# Patient Record
Sex: MALE | Race: WHITE | NOT HISPANIC OR LATINO | Employment: FULL TIME | URBAN - METROPOLITAN AREA
[De-identification: names, ages, dates, MRNs, and addresses within clinical notes are randomized per-mention and may not be internally consistent; named-entity substitution may affect disease eponyms.]

---

## 2020-11-10 ENCOUNTER — OFFICE VISIT (OUTPATIENT)
Dept: ORTHOPEDIC SURGERY | Facility: CLINIC | Age: 55
End: 2020-11-10

## 2020-11-10 VITALS — WEIGHT: 217 LBS | HEIGHT: 73 IN | BODY MASS INDEX: 28.76 KG/M2 | TEMPERATURE: 97.1 F

## 2020-11-10 DIAGNOSIS — M54.50 LOW BACK PAIN, UNSPECIFIED BACK PAIN LATERALITY, UNSPECIFIED CHRONICITY, UNSPECIFIED WHETHER SCIATICA PRESENT: Primary | ICD-10-CM

## 2020-11-10 DIAGNOSIS — M51.26 HERNIATED LUMBAR INTERVERTEBRAL DISC: ICD-10-CM

## 2020-11-10 PROCEDURE — 72100 X-RAY EXAM L-S SPINE 2/3 VWS: CPT | Performed by: ORTHOPAEDIC SURGERY

## 2020-11-10 PROCEDURE — 99204 OFFICE O/P NEW MOD 45 MIN: CPT | Performed by: ORTHOPAEDIC SURGERY

## 2020-11-10 RX ORDER — GABAPENTIN 400 MG/1
400 CAPSULE ORAL 3 TIMES DAILY
COMMUNITY
Start: 2020-10-29 | End: 2022-03-08

## 2020-11-10 RX ORDER — OMEPRAZOLE 20 MG/1
CAPSULE, DELAYED RELEASE ORAL
COMMUNITY
Start: 2020-10-14

## 2020-11-10 RX ORDER — HYDROCODONE BITARTRATE AND ACETAMINOPHEN 5; 325 MG/1; MG/1
TABLET ORAL
COMMUNITY
Start: 2020-10-29 | End: 2022-03-08

## 2020-11-10 NOTE — PROGRESS NOTES
New patient or new problem visit    Chief Complaint   Patient presents with   • Lumbar Spine - Initial Evaluation, Pain       HPI: He complains of low back pain which radiates the left lower extremity occasionally the right more back than leg pain epidurals helped a lot in the past the pain is moderate constant stabbing worse with activity.  Gets better as the day goes on.  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  He is on gabapentin and hydrocodone.  No fever chills weight loss balance difficulties bowel or bladder complaints.    PFSH: See chart- reviewed    Review of Systems   HENT: Positive for sinus pressure and sinus pain.    Respiratory: Positive for apnea.    Musculoskeletal: Positive for arthralgias, back pain, joint swelling, myalgias, neck pain and neck stiffness.   Neurological: Positive for numbness and headaches.   Psychiatric/Behavioral: Positive for sleep disturbance.       PE: Constitutional: Vital signs above-noted.  Awake, alert and oriented    Psychiatric: Affect and insight do not appear grossly disturbed.    Pulmonary: Breathing is unlabored, color is good.    Skin: Warm, dry and normal turgor    Cardiac: Pedal pulses intact.  No edema.    Eyesight and hearing appear adequate for examination purposes      Musculoskeletal:  There is some tenderness to percussion and palpation of the lumbosacral spine. Motion appears undisturbed.  Posture is unremarkable to coronal and sagittal inspection.    The skin about the area is intact.  There is no palpable or visible deformity.  There is no local spasm.       Neurologic:   Reflexes are 2+ and symmetrical in the patellae and absent in the Achilles.   Motor function is undisturbed in quadriceps, EHL, and gastrocnemius   sensation appears symmetrically intact to light touch.  In the bilateral lower extremities there is no evidence of atrophy.   Clonus is absent..  Gait appears undisturbed.  SLR test  negative      MEDICAL DECISION MAKING    XRAY: Plain film x-rays of the lumbar spine are fairly unremarkable mild degenerative changes.  No comparison views are available.  MRI scan from [facility] demonstrates herniated disc on the right side at L4-5 which has migrated caudally and then L5-S1 disc space degeneration.  There is also a mass in the body of S1 which appears around high intensity on T2 signal in slightly increased intensity on T1 which does not appear to be doing much to the surrounding bone and the radiologist states may represent an atypical hemangioma and list some other differentials including possibility of metastatic disease.  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  Still I think further evaluation is needed    Plan: I plan CT scan of the lumbar spine but I am going to go ahead and order physical therapy as well.  I am going to see him back in 6 weeks.

## 2020-11-12 ENCOUNTER — TELEPHONE (OUTPATIENT)
Dept: ORTHOPEDIC SURGERY | Facility: CLINIC | Age: 55
End: 2020-11-12

## 2020-11-12 NOTE — TELEPHONE ENCOUNTER
Caller:  GARRISON MONROE-PATIENT    Relationship: SELF    Best call back number:[phone]     What orders are you requesting (i.e. lab or imaging):  PHYSICAL THERAPY ORDER IS IN [facility]        Where will you receive your lab/imaging services: PATIENT WANTS ORDER FAXED TO [facility] IN [place]. FAX # [phone]

## 2020-11-19 ENCOUNTER — HOSPITAL ENCOUNTER (OUTPATIENT)
Dept: CT IMAGING | Facility: HOSPITAL | Age: 55
Discharge: HOME | End: 2020-11-19
Admitting: ORTHOPAEDIC SURGERY

## 2020-11-19 DIAGNOSIS — M54.50 LOW BACK PAIN, UNSPECIFIED BACK PAIN LATERALITY, UNSPECIFIED CHRONICITY, UNSPECIFIED WHETHER SCIATICA PRESENT: ICD-10-CM

## 2020-11-19 PROCEDURE — 72131 CT LUMBAR SPINE W/O DYE: CPT

## 2020-12-15 ENCOUNTER — TELEPHONE (OUTPATIENT)
Dept: ORTHOPEDIC SURGERY | Facility: CLINIC | Age: 55
End: 2020-12-15

## 2020-12-15 DIAGNOSIS — M51.26 HERNIATED LUMBAR INTERVERTEBRAL DISC: ICD-10-CM

## 2020-12-15 DIAGNOSIS — M54.50 LOW BACK PAIN, UNSPECIFIED BACK PAIN LATERALITY, UNSPECIFIED CHRONICITY, UNSPECIFIED WHETHER SCIATICA PRESENT: Primary | ICD-10-CM

## 2020-12-16 NOTE — TELEPHONE ENCOUNTER
Mri show arthritis and a pinched nerve.  For now try PT and or DANIEL.  Surgery could be a last option later. plz answer his questions as I will be unable to call him back in the near future

## 2020-12-17 NOTE — TELEPHONE ENCOUNTER
Have contacted the patient regarding his MRI results.  He does have some arthritis as well as a pinched nerve.  Have discussed that surgery is an option but he would prefer to try conservative measures before considering surgery.  Patient is agreeable and would like to hold off on surgery if at all possible.  Dr. Donahue is recommending therapy as well as epidural injections.  Patient actually is in the process of setting up physical therapy they are in [place] where he lives.  He also would like to proceed with the lumbar epidural.  Order has been placed and will have the scheduling department contact the patient to set it up.  Have instructed the patient to contact us if either 1 of these do not help with his symptoms or if his symptoms should become worse

## 2021-01-05 ENCOUNTER — HOSPITAL ENCOUNTER (OUTPATIENT)
Dept: PAIN MEDICINE | Facility: HOSPITAL | Age: 56
Discharge: HOME | End: 2021-01-05

## 2021-01-05 ENCOUNTER — ANESTHESIA EVENT (OUTPATIENT)
Dept: PAIN MEDICINE | Facility: HOSPITAL | Age: 56
End: 2021-01-05

## 2021-01-05 ENCOUNTER — ANESTHESIA (OUTPATIENT)
Dept: PAIN MEDICINE | Facility: HOSPITAL | Age: 56
End: 2021-01-05

## 2021-01-05 ENCOUNTER — HOSPITAL ENCOUNTER (OUTPATIENT)
Dept: GENERAL RADIOLOGY | Facility: HOSPITAL | Age: 56
Discharge: HOME | End: 2021-01-05

## 2021-01-05 VITALS
HEART RATE: 75 BPM | OXYGEN SATURATION: 97 % | SYSTOLIC BLOOD PRESSURE: 128 MMHG | RESPIRATION RATE: 16 BRPM | DIASTOLIC BLOOD PRESSURE: 80 MMHG | TEMPERATURE: 97.3 F

## 2021-01-05 DIAGNOSIS — R52 PAIN: ICD-10-CM

## 2021-01-05 DIAGNOSIS — M51.26 HERNIATED LUMBAR INTERVERTEBRAL DISC: ICD-10-CM

## 2021-01-05 DIAGNOSIS — M54.50 LOW BACK PAIN, UNSPECIFIED BACK PAIN LATERALITY, UNSPECIFIED CHRONICITY, UNSPECIFIED WHETHER SCIATICA PRESENT: ICD-10-CM

## 2021-01-05 PROCEDURE — C1755 CATHETER, INTRASPINAL: HCPCS

## 2021-01-05 PROCEDURE — 25010000002 METHYLPREDNISOLONE PER 80 MG: Performed by: ANESTHESIOLOGY

## 2021-01-05 PROCEDURE — 0 IOPAMIDOL 41 % SOLUTION: Performed by: ANESTHESIOLOGY

## 2021-01-05 PROCEDURE — 77003 FLUOROGUIDE FOR SPINE INJECT: CPT

## 2021-01-05 RX ORDER — FENTANYL CITRATE 50 UG/ML
50 INJECTION, SOLUTION INTRAMUSCULAR; INTRAVENOUS AS NEEDED
Status: DISCONTINUED | OUTPATIENT
Start: 2021-01-05 | End: 2021-01-06 | Stop reason: HOSPADM

## 2021-01-05 RX ORDER — SODIUM CHLORIDE 0.9 % (FLUSH) 0.9 %
3 SYRINGE (ML) INJECTION EVERY 12 HOURS SCHEDULED
Status: DISCONTINUED | OUTPATIENT
Start: 2021-01-05 | End: 2021-01-06 | Stop reason: HOSPADM

## 2021-01-05 RX ORDER — METHYLPREDNISOLONE ACETATE 80 MG/ML
80 INJECTION, SUSPENSION INTRA-ARTICULAR; INTRALESIONAL; INTRAMUSCULAR; SOFT TISSUE ONCE
Status: COMPLETED | OUTPATIENT
Start: 2021-01-05 | End: 2021-01-05

## 2021-01-05 RX ORDER — LIDOCAINE HYDROCHLORIDE 10 MG/ML
1 INJECTION, SOLUTION INFILTRATION; PERINEURAL ONCE
Status: DISCONTINUED | OUTPATIENT
Start: 2021-01-05 | End: 2021-01-06 | Stop reason: HOSPADM

## 2021-01-05 RX ORDER — MIDAZOLAM HYDROCHLORIDE 1 MG/ML
1 INJECTION INTRAMUSCULAR; INTRAVENOUS AS NEEDED
Status: DISCONTINUED | OUTPATIENT
Start: 2021-01-05 | End: 2021-01-06 | Stop reason: HOSPADM

## 2021-01-05 RX ORDER — SODIUM CHLORIDE 0.9 % (FLUSH) 0.9 %
3-10 SYRINGE (ML) INJECTION AS NEEDED
Status: DISCONTINUED | OUTPATIENT
Start: 2021-01-05 | End: 2021-01-06 | Stop reason: HOSPADM

## 2021-01-05 RX ADMIN — IOPAMIDOL 10 ML: 408 INJECTION, SOLUTION INTRATHECAL at 13:54

## 2021-01-05 RX ADMIN — METHYLPREDNISOLONE ACETATE 80 MG: 80 INJECTION, SUSPENSION INTRA-ARTICULAR; INTRALESIONAL; INTRAMUSCULAR; SOFT TISSUE at 13:55

## 2021-01-05 NOTE — ANESTHESIA PROCEDURE NOTES
PAIN Epidural block    Pre-sedation assessment completed: 1/5/2021 1:46 PM    Patient reassessed immediately prior to procedure    Patient location during procedure: pain clinic  Start Time: 1/5/2021 1:48 PM  Stop Time: 1/5/2021 1:56 PM  Indication:procedure for pain  Performed By  Anesthesiologist: Regulo Luo MD  Preanesthetic Checklist  Completed: patient identified, site marked, surgical consent, pre-op evaluation, timeout performed, IV checked, risks and benefits discussed and monitors and equipment checked  Additional Notes  Post-Op Diagnosis Codes:     * Lumbar disc herniation (M51.26)    The patient was observed in recovery with no evidence of neurological deficits or other problems. All questions were answered. The patient was discharged with appropriate instructions.  Prep:  Pt Position:prone  Sterile Tech:cap, gloves, mask and sterile barrier  Prep:chlorhexidine gluconate and isopropyl alcohol  Monitoring:blood pressure monitoring, continuous pulse oximetry and EKG  Procedure:Sedation: no     Approach:midline  Guidance: fluoroscopy  Location:lumbar  Level:4-5  Needle Type:Tuohy  Needle Gauge:20 G  Aspiration:negative  Medications:  Depomedrol:80 mg  Preservative Free Saline:3mL  Isovue:1mL  Comments:Appropriate epidural spread of contrast.   Post Assessment:  Post-procedure: Dressing per nursing.  Pt Tolerance:patient tolerated the procedure well with no apparent complications  Complications:no

## 2021-01-05 NOTE — H&P
CHIEF COMPLAINT:  Low back pain        HISTORY OF PRESENT ILLNESS:  This patient is complaining of low back pain which radiates more or less in the L5 distribution the left leg crossing into his great toe.  He sometimes gets similar symptoms in the right leg. It ranges between a 5 and 8 out of 10 on the pain scale.  The pain is described as burning, hot, constant, sharp, and stabbing in nature. The pain is exacerbated by activity, exercise, lifting, lying down, running, sitting, standing, straining, twisting, and walking, and relieved by pain meds occasionally to some extent.  The patient has tried conservative therapy including: Ice, rest, heat, over-the-counter medication, prescription medication, steroids, and physical therapy.  The pain is significantly decreasing the patient's Activities of Daily Living.      Diagnostic imaging, specifically a CT scan of his lumbar spine from 11/19/2020 shows a disc extrusion at L4-L5 and a broad disc osteophyte complex at L5-S1.    This patient was referred to our clinic by Dr. Mandeep Donahue for lumbar epidural steroid injections.      PAST MEDICAL HISTORY:  Current Outpatient Medications on File Prior to Encounter   Medication Sig Dispense Refill   • gabapentin (NEURONTIN) 400 MG capsule Take 400 mg by mouth 3 (Three) Times a Day.     • HYDROcodone-acetaminophen (NORCO) 5-325 MG per tablet TAKE ONE TABLET BY MOUTH EVERY 8 HOURS AS NEEDED FOR FOURTEEN DAYS     • omeprazole (priLOSEC) 20 MG capsule TAKE ONE CAPSULE BY MOUTH EVERY DAY 30 MINUTES BEFORE MORNING MEAL       No current facility-administered medications on file prior to encounter.        Past Medical History:   Diagnosis Date   • GERD (gastroesophageal reflux disease)    • Low back pain        SOCIAL HISTORY:  Counseled to avoid tobacco     REVIEW OF SYSTEMS:  No pertinent hematologic, infectious, or constitutional symptoms.  Other review of systems non-contributory     PHYSICAL EXAM:  /99 (BP Location: Left  arm, Patient Position: Lying)   Pulse 75   Temp 36.3 °C (97.3 °F) (Infrared)   Resp 16   SpO2 98%   Constitutional: Well-developed well-nourished no acute distress  General: Alert and oriented  Neuromuscular: Straight leg raise test positive on the left.  Decreased as well as the lumbar spine pain with lumbar extension and flexion       DIAGNOSIS:  Post-Op Diagnosis Codes:  Post-Op Diagnosis Codes:     * Lumbar disc herniation [M51.26]    PLAN:  -  Lumbar epidural steroid injections at the planned level of L4-L5 spaced approximately 2-4 weeks apart.  If pain control is acceptable after this injection, it was discussed with the patient that they may return for the subsequent injections if and when their pain returns.    - Risks were discussed with the patient, including but not limited to: failure of relief, worsening pain, tissue, nerve, blood vessel or spinal cord damage, post-dural-puncture headache, bleeding, epidural hematoma, infection, and epidural abscess. Benefits and alternatives were also discussed. No promises were made. Risks/benefits/alternatives of procedural medications were also discussed, including but not limited to hyperglycemia, fluid retention, decreased immune system, osteoporosis, and anaphylactoid reactions. The patient voiced understanding and agreed to proceed.   -  Physical therapy exercises at home should be considered, as tolerated.  -  It is recommended that the patient use the least amount of opioid medication possible.     -  Heat and ice to the affected area as tolerated for pain control.  It was discussed that heating pads can cause burns.  -  Daily low impact exercise such as walking or water exercise was recommended to maintain overall health and aid in weight control.   -  Patient was counseled to abstain from tobacco products.  -  Follow up as needed for subsequent injections.      Regulo uLo M.D.  Partner, [facility] [place], [facility], [facility]  Board  Certified in Pain Medicine by the American Board of Anesthesiology  Board Certified in Anesthesiology by the American Board of Anesthesiology     Much of this encounter note is an electronic transcription/translation of spoken language to printed text. The electronic translation of spoken language may permit erroneous, or at times, nonsensical words or phrases to be inadvertently transcribed. Although I have reviewed the note for such errors, some may still exist.

## 2021-01-26 ENCOUNTER — HOSPITAL ENCOUNTER (OUTPATIENT)
Dept: OTHER | Facility: HOSPITAL | Age: 56
Discharge: HOME | End: 2021-01-26
Attending: NURSE PRACTITIONER

## 2021-03-10 ENCOUNTER — ANESTHESIA EVENT (OUTPATIENT)
Dept: PAIN MEDICINE | Facility: HOSPITAL | Age: 56
End: 2021-03-10

## 2021-03-10 ENCOUNTER — HOSPITAL ENCOUNTER (OUTPATIENT)
Dept: GENERAL RADIOLOGY | Facility: HOSPITAL | Age: 56
Discharge: HOME | End: 2021-03-10

## 2021-03-10 ENCOUNTER — HOSPITAL ENCOUNTER (OUTPATIENT)
Dept: PAIN MEDICINE | Facility: HOSPITAL | Age: 56
Discharge: HOME | End: 2021-03-10

## 2021-03-10 ENCOUNTER — ANESTHESIA (OUTPATIENT)
Dept: PAIN MEDICINE | Facility: HOSPITAL | Age: 56
End: 2021-03-10

## 2021-03-10 VITALS
DIASTOLIC BLOOD PRESSURE: 78 MMHG | OXYGEN SATURATION: 93 % | RESPIRATION RATE: 14 BRPM | SYSTOLIC BLOOD PRESSURE: 134 MMHG | HEART RATE: 74 BPM | TEMPERATURE: 97.3 F

## 2021-03-10 DIAGNOSIS — R52 PAIN: ICD-10-CM

## 2021-03-10 DIAGNOSIS — M51.26 HERNIATED LUMBAR INTERVERTEBRAL DISC: ICD-10-CM

## 2021-03-10 PROCEDURE — 77003 FLUOROGUIDE FOR SPINE INJECT: CPT

## 2021-03-10 PROCEDURE — 0 IOPAMIDOL 41 % SOLUTION: Performed by: ANESTHESIOLOGY

## 2021-03-10 PROCEDURE — 25010000002 METHYLPREDNISOLONE PER 80 MG: Performed by: ANESTHESIOLOGY

## 2021-03-10 RX ORDER — LIDOCAINE HYDROCHLORIDE 10 MG/ML
1 INJECTION, SOLUTION INFILTRATION; PERINEURAL ONCE AS NEEDED
Status: DISCONTINUED | OUTPATIENT
Start: 2021-03-10 | End: 2021-03-11 | Stop reason: HOSPADM

## 2021-03-10 RX ORDER — FENTANYL CITRATE 50 UG/ML
50 INJECTION, SOLUTION INTRAMUSCULAR; INTRAVENOUS AS NEEDED
Status: DISCONTINUED | OUTPATIENT
Start: 2021-03-10 | End: 2021-03-11 | Stop reason: HOSPADM

## 2021-03-10 RX ORDER — MIDAZOLAM HYDROCHLORIDE 1 MG/ML
1 INJECTION INTRAMUSCULAR; INTRAVENOUS AS NEEDED
Status: DISCONTINUED | OUTPATIENT
Start: 2021-03-10 | End: 2021-03-11 | Stop reason: HOSPADM

## 2021-03-10 RX ORDER — METHYLPREDNISOLONE ACETATE 80 MG/ML
80 INJECTION, SUSPENSION INTRA-ARTICULAR; INTRALESIONAL; INTRAMUSCULAR; SOFT TISSUE ONCE
Status: COMPLETED | OUTPATIENT
Start: 2021-03-10 | End: 2021-03-10

## 2021-03-10 RX ORDER — SODIUM CHLORIDE 0.9 % (FLUSH) 0.9 %
1-10 SYRINGE (ML) INJECTION AS NEEDED
Status: DISCONTINUED | OUTPATIENT
Start: 2021-03-10 | End: 2021-03-11 | Stop reason: HOSPADM

## 2021-03-10 RX ADMIN — METHYLPREDNISOLONE ACETATE 80 MG: 80 INJECTION, SUSPENSION INTRA-ARTICULAR; INTRALESIONAL; INTRAMUSCULAR; SOFT TISSUE at 12:48

## 2021-03-10 RX ADMIN — IOPAMIDOL 10 ML: 408 INJECTION, SOLUTION INTRATHECAL at 12:48

## 2021-03-10 NOTE — H&P
[facility]    History and Physical    Patient Name: Savage Carbajal  :  1965  MRN:  [id number]  Date of Admission: 3/10/2021    Subjective     Patient is a 56 y.o. male presents with chief complaint of chronic low back, hips: left and buttock pain.  Onset of symptoms was gradual starting several years ago.  Symptoms are associated/aggravated by nothing in particular. Symptoms improve with injection    The following portions of the patients history were reviewed and updated as appropriate: current medications, allergies, past medical history, past surgical history, past family history, past social history and problem list      He reports low back pain with radicular symptoms in about an L5 distribution.  He recently had an epidural steroid injection which improved his pain in both his back and his leg by about 70%.  He would like to have repeat injection.          Objective     Past Medical History:   Past Medical History:   Diagnosis Date   • GERD (gastroesophageal reflux disease)    • Low back pain      Past Surgical History:   Past Surgical History:   Procedure Laterality Date   • EPIDURAL BLOCK       Family History: No family history on file.  Social History:   Social History     Socioeconomic History   • Marital status:      Spouse name: Not on file   • Number of children: Not on file   • Years of education: Not on file   • Highest education level: Not on file   Tobacco Use   • Smoking status: Former Smoker     Quit date:      Years since quittin.1   • Smokeless tobacco: Never Used   Substance and Sexual Activity   • Alcohol use: Yes     Alcohol/week: 3.0 standard drinks     Types: 3 Cans of beer per week   • Drug use: Never   • Sexual activity: Defer       Vital Signs Range for the last 24 hours  Temperature: Temp:  [36.3 °C (97.3 °F)] 36.3 °C (97.3 °F)   Temp Source: Temp src: Infrared   BP: BP: (126)/(85) 126/85   Pulse: Heart Rate:  [76] 76   Respirations: Resp:  [16] 16   SPO2:  SpO2:  [94 %] 94 %   O2 Amount (l/min):     O2 Devices Device (Oxygen Therapy): room air   Weight:           --------------------------------------------------------------------------------    Current Outpatient Medications   Medication Sig Dispense Refill   • gabapentin (NEURONTIN) 400 MG capsule Take 400 mg by mouth 3 (Three) Times a Day.     • HYDROcodone-acetaminophen (NORCO) 5-325 MG per tablet TAKE ONE TABLET BY MOUTH EVERY 8 HOURS AS NEEDED FOR FOURTEEN DAYS     • omeprazole (priLOSEC) 20 MG capsule TAKE ONE CAPSULE BY MOUTH EVERY DAY 30 MINUTES BEFORE MORNING MEAL       Current Facility-Administered Medications   Medication Dose Route Frequency Provider Last Rate Last Admin   • fentaNYL citrate (PF) (SUBLIMAZE) injection 50 mcg  50 mcg Intravenous PRN Chidi Fonseca MD       • iopamidol (ISOVUE-M 200) injection 41%  12 mL Epidural Once in imaging Chidi Fonseca MD       • lidocaine (XYLOCAINE) 1 % injection 1 mL  1 mL Intradermal Once PRN Chidi Fonseca MD       • methylPREDNISolone acetate (DEPO-medrol) injection 80 mg  80 mg Intra-articular Once Chidi Fonseca MD       • midazolam (VERSED) injection 1 mg  1 mg Intravenous PRN Chidi Fonseca MD       • sodium chloride 0.9 % flush 1-10 mL  1-10 mL Intravenous PRN Chidi Fonseca MD           --------------------------------------------------------------------------------  Assessment/Plan      Anesthesia Evaluation     NPO Solid Status: N/A      Pain impairs ability to perform ADLs: Working and Exercise/Activity       Airway   Mallampati: II  TM distance: >3 FB  Neck ROM: full  Dental - normal exam     Pulmonary - negative pulmonary ROS and normal exam   Cardiovascular - negative cardio ROS and normal exam        Neuro/Psych- neuro exam normal  GI/Hepatic/Renal/Endo    (+)  GERD,      Musculoskeletal     Abdominal  - normal exam   Substance History      OB/GYN          Other                   Diagnosis and Plan    Treatment Plan  ASA 2  "  Patient has had previous injection/procedure with % and 50-75% improvement.   Procedures: Lumbar Epidural Steroid Injection(LESI), With fluoroscopy,           Discussed with patient risk and benefits of DANIEL including but not limited to : Bleeding, infection, PDPH, inadvertant spinal anesthetic, worsening pain, hyperglycemia, hypertension, CHF, nerve damage, steroid \"toxicity\" and AVN of hips.  He understands this only stops inflammation and may not address any mechanical issues.  Any relief he gets may be temporary.  Pt agrees to proceed.    Diagnosis     * Lumbar neuritis [M54.16]     * Lumbar disc herniation [M51.26]                    "

## 2021-03-10 NOTE — ANESTHESIA PROCEDURE NOTES
PAIN Epidural block    Pre-sedation assessment completed: 3/10/2021 12:41 PM    Patient reassessed immediately prior to procedure    Patient location during procedure: pain clinic  Start Time: 3/10/2021 12:41 PM  Stop Time: 3/10/2021 12:48 PM  Indication:at surgeon's request and procedure for pain  Performed By  Anesthesiologist: Chidi Fonseca MD  Preanesthetic Checklist  Completed: patient identified, site marked, risks and benefits discussed, surgical consent, monitors and equipment checked, pre-op evaluation and timeout performed  Additional Notes  Post-Op Diagnosis Codes:     * Lumbar neuritis (M54.16)     * Lumbar disc herniation (M51.26)    Prep:  Pt Position:prone  Sterile Tech:sterile barrier, mask and gloves  Prep:chlorhexidine gluconate and isopropyl alcohol  Monitoring:blood pressure monitoring, continuous pulse oximetry and EKG  Procedure:Sedation: no     Approach:left paramedian  Guidance: fluoroscopy  Location:lumbar  Level:4-5  Needle Gauge:20 G  Aspiration:negative  Test Dose:negative  Medications:  Depomedrol:80 mg  Preservative Free Saline:2mL  Isovue:2mL    Post Assessment:  Pt Tolerance:patient tolerated the procedure well with no apparent complications  Complications:no

## 2021-04-15 ENCOUNTER — APPOINTMENT (OUTPATIENT)
Dept: PAIN MEDICINE | Facility: HOSPITAL | Age: 56
End: 2021-04-15

## 2021-04-29 ENCOUNTER — ANESTHESIA (OUTPATIENT)
Dept: PAIN MEDICINE | Facility: HOSPITAL | Age: 56
End: 2021-04-29

## 2021-04-29 ENCOUNTER — HOSPITAL ENCOUNTER (OUTPATIENT)
Dept: PAIN MEDICINE | Facility: HOSPITAL | Age: 56
Discharge: HOME | End: 2021-04-29

## 2021-04-29 ENCOUNTER — HOSPITAL ENCOUNTER (OUTPATIENT)
Dept: GENERAL RADIOLOGY | Facility: HOSPITAL | Age: 56
Discharge: HOME | End: 2021-04-29

## 2021-04-29 ENCOUNTER — ANESTHESIA EVENT (OUTPATIENT)
Dept: PAIN MEDICINE | Facility: HOSPITAL | Age: 56
End: 2021-04-29

## 2021-04-29 VITALS
BODY MASS INDEX: 28.11 KG/M2 | SYSTOLIC BLOOD PRESSURE: 143 MMHG | HEIGHT: 74 IN | HEART RATE: 66 BPM | WEIGHT: 219 LBS | RESPIRATION RATE: 16 BRPM | TEMPERATURE: 97.3 F | OXYGEN SATURATION: 94 % | DIASTOLIC BLOOD PRESSURE: 86 MMHG

## 2021-04-29 DIAGNOSIS — M51.26 HERNIATED LUMBAR INTERVERTEBRAL DISC: ICD-10-CM

## 2021-04-29 DIAGNOSIS — M54.50 LUMBAR PAIN: ICD-10-CM

## 2021-04-29 PROCEDURE — 25010000002 METHYLPREDNISOLONE PER 80 MG: Performed by: ANESTHESIOLOGY

## 2021-04-29 PROCEDURE — 0 IOPAMIDOL 41 % SOLUTION: Performed by: ANESTHESIOLOGY

## 2021-04-29 PROCEDURE — 77003 FLUOROGUIDE FOR SPINE INJECT: CPT

## 2021-04-29 RX ORDER — FENTANYL CITRATE 50 UG/ML
50 INJECTION, SOLUTION INTRAMUSCULAR; INTRAVENOUS AS NEEDED
Status: DISCONTINUED | OUTPATIENT
Start: 2021-04-29 | End: 2021-04-30 | Stop reason: HOSPADM

## 2021-04-29 RX ORDER — MIDAZOLAM HYDROCHLORIDE 1 MG/ML
1 INJECTION INTRAMUSCULAR; INTRAVENOUS AS NEEDED
Status: DISCONTINUED | OUTPATIENT
Start: 2021-04-29 | End: 2021-04-30 | Stop reason: HOSPADM

## 2021-04-29 RX ORDER — LIDOCAINE HYDROCHLORIDE 10 MG/ML
1 INJECTION, SOLUTION INFILTRATION; PERINEURAL ONCE AS NEEDED
Status: DISCONTINUED | OUTPATIENT
Start: 2021-04-29 | End: 2021-04-30 | Stop reason: HOSPADM

## 2021-04-29 RX ORDER — METHYLPREDNISOLONE ACETATE 80 MG/ML
80 INJECTION, SUSPENSION INTRA-ARTICULAR; INTRALESIONAL; INTRAMUSCULAR; SOFT TISSUE ONCE
Status: COMPLETED | OUTPATIENT
Start: 2021-04-29 | End: 2021-04-29

## 2021-04-29 RX ORDER — SODIUM CHLORIDE 0.9 % (FLUSH) 0.9 %
1-10 SYRINGE (ML) INJECTION AS NEEDED
Status: DISCONTINUED | OUTPATIENT
Start: 2021-04-29 | End: 2021-04-30 | Stop reason: HOSPADM

## 2021-04-29 RX ADMIN — METHYLPREDNISOLONE ACETATE 80 MG: 80 INJECTION, SUSPENSION INTRA-ARTICULAR; INTRALESIONAL; INTRAMUSCULAR; SOFT TISSUE at 14:29

## 2021-04-29 RX ADMIN — IOPAMIDOL 10 ML: 408 INJECTION, SOLUTION INTRATHECAL at 14:29

## 2021-04-29 NOTE — H&P
[facility]    History and Physical    Patient Name: Savage Carbajal  :  1965  MRN:  [id number]  Date of Admission: 2021    Subjective     Patient is a 56 y.o. male presents with chief complaint of chronic low back and hips: bilateral pain.  Onset of symptoms was gradual starting several years ago.  Symptoms are associated/aggravated by nothing in particular. Symptoms improve with injection    The following portions of the patients history were reviewed and updated as appropriate: current medications, allergies, past medical history, past surgical history, past family history, past social history and problem list    He has a history of chronic back pain that radiates into both of his lower extremities.  The left is significantly more so than the right.  He has had 2 previous epidural steroid injections recently.  The first 1 gave him many weeks of excellent relief, the second 1 did give him excellent relief but it was not as long acting.  He feels like he has been much more active as the weather is gotten better and he attributes some of his pain to that.            Objective     Past Medical History:   Past Medical History:   Diagnosis Date   • GERD (gastroesophageal reflux disease)    • Low back pain      Past Surgical History:   Past Surgical History:   Procedure Laterality Date   • EPIDURAL BLOCK       Family History: No family history on file.  Social History:   Social History     Socioeconomic History   • Marital status:      Spouse name: Not on file   • Number of children: Not on file   • Years of education: Not on file   • Highest education level: Not on file   Tobacco Use   • Smoking status: Former Smoker     Quit date:      Years since quittin.3   • Smokeless tobacco: Never Used   Substance and Sexual Activity   • Alcohol use: Yes     Alcohol/week: 3.0 standard drinks     Types: 3 Cans of beer per week   • Drug use: Never   • Sexual activity: Defer       Vital Signs Range  for the last 24 hours  Temperature:     Temp Source:     BP:     Pulse:     Respirations:     SPO2:     O2 Amount (l/min):     O2 Devices     Weight:           --------------------------------------------------------------------------------    Current Outpatient Medications   Medication Sig Dispense Refill   • gabapentin (NEURONTIN) 400 MG capsule Take 400 mg by mouth 3 (Three) Times a Day.     • HYDROcodone-acetaminophen (NORCO) 5-325 MG per tablet TAKE ONE TABLET BY MOUTH EVERY 8 HOURS AS NEEDED FOR FOURTEEN DAYS     • omeprazole (priLOSEC) 20 MG capsule TAKE ONE CAPSULE BY MOUTH EVERY DAY 30 MINUTES BEFORE MORNING MEAL       Current Facility-Administered Medications   Medication Dose Route Frequency Provider Last Rate Last Admin   • fentaNYL citrate (PF) (SUBLIMAZE) injection 50 mcg  50 mcg Intravenous PRN Chidi Fonseca MD       • iopamidol (ISOVUE-M 200) injection 41%  12 mL Epidural Once in imaging Chidi Fonseca MD       • lidocaine (XYLOCAINE) 1 % injection 1 mL  1 mL Intradermal Once PRN Chidi Fonseca MD       • methylPREDNISolone acetate (DEPO-medrol) injection 80 mg  80 mg Intra-articular Once Chidi Fonseca MD       • midazolam (VERSED) injection 1 mg  1 mg Intravenous PRN Chidi Fonseca MD       • sodium chloride 0.9 % flush 1-10 mL  1-10 mL Intravenous PRN Chidi Fonseca MD           --------------------------------------------------------------------------------  Assessment/Plan      Anesthesia Evaluation     NPO Solid Status: > 8 hours      Pain impairs ability to perform ADLs: Exercise/Activity       Airway   Mallampati: II  TM distance: >3 FB  Neck ROM: full  Dental - normal exam     Pulmonary - normal exam   (+) a smoker Former,   Cardiovascular - negative cardio ROS and normal exam        Neuro/Psych- neuro exam normal  GI/Hepatic/Renal/Endo    (+)  GERD,      Musculoskeletal (-) normal exam    Abdominal  - normal exam   Substance History      OB/GYN          Other     "               Diagnosis and Plan    Treatment Plan  ASA 2   Patient has had previous injection/procedure with % improvement.   Procedures: Lumbar Epidural Steroid Injection(LESI), With fluoroscopy,       Anesthetic plan and risks discussed with patient.      Discussed with patient risk and benefits of DANIEL including but not limited to : Bleeding, infection, PDPH, inadvertant spinal anesthetic, worsening pain, hyperglycemia, hypertension, CHF, nerve damage, steroid \"toxicity\" and AVN of hips.  Pt agrees to proceed.    Diagnosis     * Lumbar disc herniation [M51.26]                    "

## 2021-04-29 NOTE — ANESTHESIA PROCEDURE NOTES
PAIN Epidural block    Pre-sedation assessment completed: 4/29/2021 2:24 PM    Patient reassessed immediately prior to procedure    Patient location during procedure: pain clinic  Start Time: 4/29/2021 2:24 PM  Stop Time: 4/29/2021 2:30 PM  Indication:at surgeon's request and procedure for pain  Performed By  Anesthesiologist: Chidi Fonseca MD  Preanesthetic Checklist  Completed: patient identified, risks and benefits discussed, surgical consent, monitors and equipment checked, pre-op evaluation and timeout performed  Additional Notes  Post-Op Diagnosis Codes:     * Lumbar disc herniation (M51.26)    Prep:  Pt Position:prone  Sterile Tech:sterile barrier, mask and gloves  Prep:chlorhexidine gluconate and isopropyl alcohol  Monitoring:blood pressure monitoring, continuous pulse oximetry and EKG  Procedure:Sedation: no     Approach:left paramedian  Guidance: fluoroscopy  Location:lumbar  Level:4-5  Needle Type:Tuohy  Needle Gauge:20 G  Aspiration:negative  Test Dose:negative  Medications:  Depomedrol:80 mg  Preservative Free Saline:2mL  Isovue:2mL    Post Assessment:  Pt Tolerance:patient tolerated the procedure well with no apparent complications  Complications:no

## 2021-05-04 ENCOUNTER — HOSPITAL ENCOUNTER (OUTPATIENT)
Dept: GASTROENTEROLOGY | Facility: HOSPITAL | Age: 56
Setting detail: HOSPITAL OUTPATIENT SURGERY
Discharge: HOME | End: 2021-05-04
Attending: INTERNAL MEDICINE

## 2021-05-17 ENCOUNTER — OFFICE VISIT CONVERTED (OUTPATIENT)
Dept: GASTROENTEROLOGY | Facility: CLINIC | Age: 56
End: 2021-05-17
Attending: NURSE PRACTITIONER

## 2021-05-22 ENCOUNTER — TRANSCRIBE ORDERS (OUTPATIENT)
Dept: ADMINISTRATIVE | Facility: HOSPITAL | Age: 56
End: 2021-05-22

## 2021-05-22 DIAGNOSIS — R13.10 DYSPHAGIA, UNSPECIFIED TYPE: Primary | ICD-10-CM

## 2021-06-05 NOTE — PROGRESS NOTES
Progress Note      Patient Name: Júnior Carbajal   Patient ID: [id number]   Sex: Male   YOB: 1965    Primary Care Provider: MIKEL GRIGGS   Referring Provider: MIKEL GRIGGS    Visit Date: May 17, 2021    Provider: JUDITH Holcomb   Location: [facility] [place]   Location Address: [address] [place] [address]  [address] [place] [address]  [phone]          Chief Complaint  · Follow-up of EGD      History Of Present Illness     Mr. Carbajal presents today for f/u EGD after food impaction in March. He was then dilated in May and has felt relief since. No longer having any dysphagia. The only thing he complains of is a chronic cough at night, occasionally it is productive. The cough will wake him up at night.  The only thing he correlates to the cough is eating within an hour before going to bed.  He states he has done some research and has read that that is not good for acid reflux and is trying to do better.  Drinks 2 to 3 cups of caffeine daily.  Feels that heartburn is controlled on PPI.  He denies any nausea, vomiting, change in appetite, or weight loss.    EGD 5/4/2021: Normal mucosa of duodenum and whole stomach.  Hiatal hernia.  Ring in the GE junction that was dilated with a 15 to 18 mm balloon and then a 18 to 20 mm balloon.  Irregularity in the Z-line and GE junction.  Tertiary contractions visualized.  Antrum biopsyreactive gastropathy.  GE junction biopsysquamocolumnar mucosa with focal intestinal metaplasia and features suggestive of reflux esophagitis.    EGD 3/31/2021: Erythema and erosions in the first and second part of duodenum compatible with duodenitis.  Normal mucosa in whole stomach.  Small size hiatal hernia.  Food in the GE junction.  Ring in the GE junction.  Recommend repeat EGD in 1 to 2 weeks for evaluation and possible dilatation.       Past Medical History  Degenerative Disc Disease ; Dysphagia; GERD; Neuropathy         Past Surgical  "History  MENISCUS TEAR         Medication List  gabapentin 400 mg oral capsule; hydrocodone-acetaminophen 5-325 mg oral tablet; omeprazole 40 mg oral capsule,delayed release(DR/EC)         Allergy List  NO KNOWN DRUG ALLERGIES       Allergies Reconciled  Social History  Tobacco (Former)         Review of Systems  · Constitutional  o Denies  o : chills, fever  · Cardiovascular  o Denies  o : chest pain, dyspnea on exertion  · Respiratory  o Admits  o : cough  o Denies  o : shortness of breath  · Gastrointestinal  o Admits  o : see HPI   · Endocrine  o Denies  o : weight gain, weight loss      Vitals  Date Time BP Position Site L\R Cuff Size HR RR TEMP (F) WT  HT  BMI kg/m2 BSA m2 O2 Sat FR L/min FiO2 HC       05/17/2021 01:07 /76 Sitting    75 - R   228lbs 0oz 6'  2\" 29.27 2.32             Physical Examination  · Constitutional  o Appearance  o : Healthy-appearing, awake and alert in no acute distress  · Head and Face  o Head  o : Normocephalic with no worriesome skin lesions  · Eyes  o Vision  o :   § Visual Fields  § : eyes move symmetrical in all directions  o Sclerae  o : sclerae anicteric  o Pupils and Irises  o : pupils equal and symmetrical  · Neck  o Inspection/Palpation  o : Trachea is midline, no adenopathy  · Respiratory  o Respiratory Effort  o : Breathing is unlabored.  o Inspection of Chest  o : normal appearance  o Auscultation of Lungs  o : Chest is clear to auscultation bilaterally.  · Cardiovascular  o Heart  o :   § Auscultation of Heart  § : no murmurs, rubs, or gallops  o Peripheral Vascular System  o :   § Extremities  § : no cyanosis, clubbing or edema;   · Gastrointestinal  o Abdominal Examination  o : Abdomen is soft, nontender to palpation, with normal active bowel sounds, no appreciable hepatosplenomegaly.  o Digital Rectal Exam  o : deferred  · Skin and Subcutaneous Tissue  o General Inspection  o : without focal lesions; turgor is normal  · Psychiatric  o General  o : Alert and " oriented x3  o Mood and Affect  o : Mood and affect are appropriate to circumstances          Assessment  · Estrada's esophagus     530.85  · Gastritis, Other specified     535.40  · Gastroesophageal Reflux     530.81/K21.9  · Hernia, Hiatal     553.3/K44.9  · Esophageal dilatation     530.89/K22.8      Plan  · Medications  o omeprazole 40 mg oral capsule,delayed release(DR/EC)   SIG: take 1 capsule (40 mg) by oral route once daily before a meal for 90 days   DISP: (90) Capsule with 1 refills  Adjusted on 05/17/2021     o Medications have been Reconciled  o Transition of Care or Provider Policy  · Instructions  o Information given on current diagnoses.  o Lifestyle modifications discussed.  o Discussed risks of long-term PPI use.  o Recall EGD 5/2022  o Educated patient to please call office if symptoms of dysphagia return, will order barium esophagram.  o Electronically Identified Patient Education Materials Provided Electronically  · Disposition  o 1 year f/u  o Follow up PRN-Call if any change in bowel pattern, abdominal pain, rectal bleeding, or any new GI complaint            Electronically Signed by: JUDITH Holcomb -Author on May 17, 2021 01:43:12 PM

## 2021-07-15 VITALS
SYSTOLIC BLOOD PRESSURE: 131 MMHG | WEIGHT: 228 LBS | HEART RATE: 75 BPM | HEIGHT: 74 IN | BODY MASS INDEX: 29.26 KG/M2 | DIASTOLIC BLOOD PRESSURE: 76 MMHG

## 2022-03-04 DIAGNOSIS — M25.561 RIGHT KNEE PAIN, UNSPECIFIED CHRONICITY: Primary | ICD-10-CM

## 2022-03-08 ENCOUNTER — OFFICE VISIT (OUTPATIENT)
Dept: ORTHOPEDIC SURGERY | Facility: CLINIC | Age: 57
End: 2022-03-08

## 2022-03-08 ENCOUNTER — HOSPITAL ENCOUNTER (OUTPATIENT)
Dept: GENERAL RADIOLOGY | Facility: HOSPITAL | Age: 57
Discharge: HOME | End: 2022-03-08
Admitting: PHYSICIAN ASSISTANT

## 2022-03-08 VITALS — TEMPERATURE: 97.6 F | WEIGHT: 227 LBS | HEIGHT: 74 IN | BODY MASS INDEX: 29.13 KG/M2

## 2022-03-08 DIAGNOSIS — M25.561 RIGHT KNEE PAIN, UNSPECIFIED CHRONICITY: ICD-10-CM

## 2022-03-08 DIAGNOSIS — M17.11 PRIMARY OSTEOARTHRITIS OF RIGHT KNEE: Primary | ICD-10-CM

## 2022-03-08 PROCEDURE — 20610 DRAIN/INJ JOINT/BURSA W/O US: CPT | Performed by: PHYSICIAN ASSISTANT

## 2022-03-08 PROCEDURE — 99214 OFFICE O/P EST MOD 30 MIN: CPT | Performed by: PHYSICIAN ASSISTANT

## 2022-03-08 PROCEDURE — 73562 X-RAY EXAM OF KNEE 3: CPT

## 2022-03-08 RX ORDER — GABAPENTIN 600 MG/1
600 TABLET ORAL 3 TIMES DAILY
COMMUNITY
Start: 2022-03-01

## 2022-03-08 RX ADMIN — METHYLPREDNISOLONE ACETATE 160 MG: 80 INJECTION, SUSPENSION INTRA-ARTICULAR; INTRALESIONAL; INTRAMUSCULAR; SOFT TISSUE at 14:33

## 2022-03-08 RX ADMIN — LIDOCAINE HYDROCHLORIDE 2 ML: 10 INJECTION, SOLUTION EPIDURAL; INFILTRATION; INTRACAUDAL; PERINEURAL at 14:33

## 2022-03-10 ENCOUNTER — TELEPHONE (OUTPATIENT)
Dept: GASTROENTEROLOGY | Facility: CLINIC | Age: 57
End: 2022-03-10

## 2022-03-10 NOTE — TELEPHONE ENCOUNTER
Per endoscopy records, EGD in 1 year is recommended for f/u of Estrada's esophagus.  OK to schedule.  I do not see a colonoscopy in our records.  Recommend screening colonoscopy if he has never had one or does not f/u with other colon cancer screening program with his PCP.

## 2022-03-10 NOTE — TELEPHONE ENCOUNTER
Patient has called the office and states he received a letter that he needs a repeat colonoscopy in 1 year. Patient states that he had an egd in May of 2021 and wants to confirm he needs a repeat egd in a year. Please advise.

## 2022-03-11 ENCOUNTER — PREP FOR SURGERY (OUTPATIENT)
Dept: OTHER | Facility: HOSPITAL | Age: 57
End: 2022-03-11

## 2022-03-11 DIAGNOSIS — K22.70 BARRETT'S ESOPHAGUS WITHOUT DYSPLASIA: ICD-10-CM

## 2022-03-11 DIAGNOSIS — K22.719 BARRETT'S ESOPHAGUS WITH DYSPLASIA: Primary | ICD-10-CM

## 2022-03-14 PROBLEM — K22.70 BARRETT'S ESOPHAGUS WITHOUT DYSPLASIA: Status: ACTIVE | Noted: 2022-03-14

## 2022-03-14 NOTE — TELEPHONE ENCOUNTER
Patient has been scheduled for a 07/01/2022 with an arrival time of 12:00.   Patient is not on any blood thinners and is vaccinated.   Patient aware of date and time.

## 2022-03-20 PROBLEM — M17.11 PRIMARY OSTEOARTHRITIS OF RIGHT KNEE: Status: ACTIVE | Noted: 2022-03-20

## 2022-03-20 RX ORDER — LIDOCAINE HYDROCHLORIDE 10 MG/ML
2 INJECTION, SOLUTION EPIDURAL; INFILTRATION; INTRACAUDAL; PERINEURAL
Status: COMPLETED | OUTPATIENT
Start: 2022-03-08 | End: 2022-03-08

## 2022-03-20 RX ORDER — METHYLPREDNISOLONE ACETATE 80 MG/ML
160 INJECTION, SUSPENSION INTRA-ARTICULAR; INTRALESIONAL; INTRAMUSCULAR; SOFT TISSUE
Status: COMPLETED | OUTPATIENT
Start: 2022-03-08 | End: 2022-03-08

## 2022-06-20 ENCOUNTER — TELEPHONE (OUTPATIENT)
Dept: GASTROENTEROLOGY | Facility: CLINIC | Age: 57
End: 2022-06-20

## 2022-06-20 NOTE — TELEPHONE ENCOUNTER
I have called patient and left a detailed message for an upcoming procedure including date, time and a good callback number for any questions.   Mychart reminder also sent.

## 2022-06-24 ENCOUNTER — OFFICE VISIT (OUTPATIENT)
Dept: ORTHOPEDIC SURGERY | Facility: CLINIC | Age: 57
End: 2022-06-24

## 2022-06-24 DIAGNOSIS — M17.11 PRIMARY OSTEOARTHRITIS OF RIGHT KNEE: Primary | ICD-10-CM

## 2022-06-24 DIAGNOSIS — M17.12 PRIMARY OSTEOARTHRITIS OF LEFT KNEE: ICD-10-CM

## 2022-06-24 PROCEDURE — 20610 DRAIN/INJ JOINT/BURSA W/O US: CPT | Performed by: PHYSICIAN ASSISTANT

## 2022-06-24 PROCEDURE — 99214 OFFICE O/P EST MOD 30 MIN: CPT | Performed by: PHYSICIAN ASSISTANT

## 2022-06-24 RX ADMIN — METHYLPREDNISOLONE ACETATE 160 MG: 80 INJECTION, SUSPENSION INTRA-ARTICULAR; INTRALESIONAL; INTRAMUSCULAR; SOFT TISSUE at 13:29

## 2022-06-24 RX ADMIN — METHYLPREDNISOLONE ACETATE 160 MG: 80 INJECTION, SUSPENSION INTRA-ARTICULAR; INTRALESIONAL; INTRAMUSCULAR; SOFT TISSUE at 13:20

## 2022-06-24 NOTE — PROGRESS NOTES
Chief Complaint  Follow-up of the Right Knee    Subjective    History of Present Illness      Júnior Carbajal is a 57 y.o. male who presents to [facility] for injection therapy. He receives  RIGHT knee injections of Depo-Medrol. His first injection with me was in early March. Since last injection, patient notes great relief of symptoms. He is also having pain in the left knee. His symptoms are very similar to the right knee. He reports pain at the medial aspect of the knee that is worse with stairs, activity. His pain has been present for several months. Treatment options have been discussed and he understands and consents.       Objective   Vital Signs:   There were no vitals taken for this visit.    Physical Exam  57 y.o. male is awake, alert, oriented, in no acute distress and well developed, well nourished.  Ortho Exam   RIGHT knee  There is mild joint line tenderness at the medial aspect of the knee.   Positive for varus orientation of the knee.   Positive for crepitus throughout range of motion.   Negative for effusion.  Positive patellar grind test.   Negative Lachman test.    Negative anterior and posterior drawer.  Range of motion in extension and flexion is: 120 degrees.  Neurovascular status is intact.    Dorsalis pedis and posterior tibial artery pulses are palpable.    Common peroneal nerve function is well preserved.  Gait is cautious and antalgic.        Result Review :   Radiologic studies - see below for interpretation        PROCEDURE  Large Joint Arthrocentesis: R knee  Date/Time: 6/24/2022 1:20 PM  Consent given by: patient  Site marked: site marked  Timeout: Immediately prior to procedure a time out was called to verify the correct patient, procedure, equipment, support staff and site/side marked as required   Supporting Documentation  Indications: pain   Procedure Details  Location: knee - R knee  Preparation: Patient was prepped and draped in the usual sterile  fashion  Needle size: 25 G  Approach: anteromedial  Medications administered: 160 mg methylPREDNISolone acetate 80 MG/ML  Patient tolerance: patient tolerated the procedure well with no immediate complications    Large Joint Arthrocentesis: L knee  Date/Time: 6/24/2022 1:29 PM  Consent given by: patient  Site marked: site marked  Timeout: Immediately prior to procedure a time out was called to verify the correct patient, procedure, equipment, support staff and site/side marked as required   Supporting Documentation  Indications: pain   Procedure Details  Location: knee - L knee  Preparation: Patient was prepped and draped in the usual sterile fashion  Needle size: 25 G  Approach: anteromedial  Medications administered: 160 mg methylPREDNISolone acetate 80 MG/ML  Patient tolerance: patient tolerated the procedure well with no immediate complications             Injection site was identified by physical examination and cleaned with Betadine and alcohol swabs. Prior to needle insertion, ethyl chloride spray was used for surface anesthesia. Sterile technique was used.       Assessment   Assessment and Plan    Problem List Items Addressed This Visit        Musculoskeletal and Injuries    Primary osteoarthritis of right knee - Primary    Relevant Orders    Large Joint Arthrocentesis: R knee (Completed)    Visco Treatment    Primary osteoarthritis of left knee    Relevant Orders    Large Joint Arthrocentesis: L knee (Completed)    XR Knee 3 View Left    Visco Treatment          Follow Up   • Bilateral knee Depo-Medrol injection was discussed with the patient. Discussed indication, risks, benefits, and alternatives. Verbal consent was given to proceed with the procedure.   • Injection was performed from anteromedial approach.  Patient tolerated the procedure well and no complications were encountered.  • Discussion of orthopedic goals and activities and patient/guardian expressed understanding.  • Ice, heat, rest,  compression and elevation of extremity as beneficial  • nsaids and/or tylenol as beneficial  • Instructed to refrain from heavy activity/rest the extremity for the next 24-48 hours  • Discussion regarding possibility of cortisol flare and what to expect if this occurs  • Watch for signs and symptoms of infection  • Call if adverse effect from injection therapy  • Follow up in 3 months  • Patient was given instructions and counseling regarding his condition or for health maintenance advice. Please see specific information pulled into the AVS if appropriate.     Saw Ayala PA-C   Date of Encounter: 6/24/2022   Electronically signed by Saw Ayala PA-C, 06/27/22, 8:40 AM EDT.     EMR Dragon/Transcription disclaimer:  Much of this encounter note is an electronic transcription/translation of spoken language to printed text. The electronic translation of spoken language may permit erroneous, or at times, nonsensical words or phrases to be inadvertently transcribed; Although I have reviewed the note for such errors, some may still exist.

## 2022-06-27 PROBLEM — M17.12 PRIMARY OSTEOARTHRITIS OF LEFT KNEE: Status: ACTIVE | Noted: 2022-06-27

## 2022-06-27 RX ORDER — METHYLPREDNISOLONE ACETATE 80 MG/ML
160 INJECTION, SUSPENSION INTRA-ARTICULAR; INTRALESIONAL; INTRAMUSCULAR; SOFT TISSUE
Status: COMPLETED | OUTPATIENT
Start: 2022-06-24 | End: 2022-06-24

## 2022-06-29 NOTE — TELEPHONE ENCOUNTER
Patient states that he would like to r/s procedure.   Patient has been r/s for 11/10/22 with an arrival time of 0800. Patient aware of date and time.

## 2022-09-14 ENCOUNTER — HOSPITAL ENCOUNTER (OUTPATIENT)
Dept: GENERAL RADIOLOGY | Facility: HOSPITAL | Age: 57
Discharge: HOME | End: 2022-09-14
Admitting: NURSE PRACTITIONER

## 2022-09-14 ENCOUNTER — TRANSCRIBE ORDERS (OUTPATIENT)
Dept: ADMINISTRATIVE | Facility: HOSPITAL | Age: 57
End: 2022-09-14

## 2022-09-14 DIAGNOSIS — M25.562 LEFT KNEE PAIN, UNSPECIFIED CHRONICITY: Primary | ICD-10-CM

## 2022-09-14 DIAGNOSIS — M25.562 LEFT KNEE PAIN, UNSPECIFIED CHRONICITY: ICD-10-CM

## 2022-09-14 PROCEDURE — 73562 X-RAY EXAM OF KNEE 3: CPT

## 2022-09-23 DIAGNOSIS — M17.12 PRIMARY OSTEOARTHRITIS OF LEFT KNEE: Primary | ICD-10-CM

## 2022-09-26 ENCOUNTER — OFFICE VISIT (OUTPATIENT)
Dept: ORTHOPEDIC SURGERY | Facility: CLINIC | Age: 57
End: 2022-09-26

## 2022-09-26 ENCOUNTER — TELEPHONE (OUTPATIENT)
Dept: ORTHOPEDIC SURGERY | Facility: CLINIC | Age: 57
End: 2022-09-26

## 2022-09-26 DIAGNOSIS — M17.12 PRIMARY OSTEOARTHRITIS OF LEFT KNEE: ICD-10-CM

## 2022-09-26 DIAGNOSIS — M17.11 PRIMARY OSTEOARTHRITIS OF RIGHT KNEE: Primary | ICD-10-CM

## 2022-09-26 PROCEDURE — 20610 DRAIN/INJ JOINT/BURSA W/O US: CPT | Performed by: ORTHOPAEDIC SURGERY

## 2022-09-26 PROCEDURE — 99213 OFFICE O/P EST LOW 20 MIN: CPT | Performed by: ORTHOPAEDIC SURGERY

## 2022-09-26 RX ADMIN — LIDOCAINE HYDROCHLORIDE 2 ML: 20 INJECTION, SOLUTION EPIDURAL; INFILTRATION; INTRACAUDAL; PERINEURAL at 15:46

## 2022-10-09 RX ORDER — LIDOCAINE HYDROCHLORIDE 20 MG/ML
2 INJECTION, SOLUTION EPIDURAL; INFILTRATION; INTRACAUDAL; PERINEURAL
Status: COMPLETED | OUTPATIENT
Start: 2022-09-26 | End: 2022-09-26

## 2022-10-11 DIAGNOSIS — M17.11 PRIMARY OSTEOARTHRITIS OF RIGHT KNEE: ICD-10-CM

## 2022-10-11 DIAGNOSIS — M17.12 PRIMARY OSTEOARTHRITIS OF LEFT KNEE: Primary | ICD-10-CM

## 2022-10-26 ENCOUNTER — TELEPHONE (OUTPATIENT)
Dept: GASTROENTEROLOGY | Facility: CLINIC | Age: 57
End: 2022-10-26

## 2023-01-05 ENCOUNTER — CLINICAL SUPPORT (OUTPATIENT)
Dept: ORTHOPEDIC SURGERY | Facility: CLINIC | Age: 58
End: 2023-01-05
Payer: COMMERCIAL

## 2023-01-05 VITALS — BODY MASS INDEX: 28.23 KG/M2 | HEIGHT: 74 IN | TEMPERATURE: 97.9 F | WEIGHT: 220 LBS

## 2023-01-05 DIAGNOSIS — M17.11 PRIMARY OSTEOARTHRITIS OF RIGHT KNEE: ICD-10-CM

## 2023-01-05 DIAGNOSIS — M17.12 PRIMARY OSTEOARTHRITIS OF LEFT KNEE: Primary | ICD-10-CM

## 2023-01-05 PROCEDURE — 20610 DRAIN/INJ JOINT/BURSA W/O US: CPT | Performed by: ORTHOPAEDIC SURGERY

## 2023-01-05 RX ORDER — METHYLPREDNISOLONE ACETATE 80 MG/ML
160 INJECTION, SUSPENSION INTRA-ARTICULAR; INTRALESIONAL; INTRAMUSCULAR; SOFT TISSUE
Status: COMPLETED | OUTPATIENT
Start: 2023-01-05 | End: 2023-01-05

## 2023-01-05 RX ORDER — LIDOCAINE HYDROCHLORIDE 20 MG/ML
2 INJECTION, SOLUTION EPIDURAL; INFILTRATION; INTRACAUDAL; PERINEURAL
Status: COMPLETED | OUTPATIENT
Start: 2023-01-05 | End: 2023-01-05

## 2023-01-05 RX ADMIN — METHYLPREDNISOLONE ACETATE 160 MG: 80 INJECTION, SUSPENSION INTRA-ARTICULAR; INTRALESIONAL; INTRAMUSCULAR; SOFT TISSUE at 15:25

## 2023-01-05 RX ADMIN — LIDOCAINE HYDROCHLORIDE 2 ML: 20 INJECTION, SOLUTION EPIDURAL; INFILTRATION; INTRACAUDAL; PERINEURAL at 15:25

## 2023-03-17 ENCOUNTER — TELEPHONE (OUTPATIENT)
Dept: GASTROENTEROLOGY | Facility: CLINIC | Age: 58
End: 2023-03-17
Payer: COMMERCIAL

## 2023-03-24 ENCOUNTER — TELEPHONE (OUTPATIENT)
Dept: GASTROENTEROLOGY | Facility: CLINIC | Age: 58
End: 2023-03-24
Payer: COMMERCIAL

## 2023-03-27 NOTE — TELEPHONE ENCOUNTER
Pts order placed on 3/11/22. Please advise if we should update order or if patient will need an office visit or nurse visit to complete egd.

## 2023-04-06 ENCOUNTER — CLINICAL SUPPORT (OUTPATIENT)
Dept: ORTHOPEDIC SURGERY | Facility: CLINIC | Age: 58
End: 2023-04-06
Payer: COMMERCIAL

## 2023-04-06 VITALS — TEMPERATURE: 97.5 F | WEIGHT: 220 LBS | BODY MASS INDEX: 28.23 KG/M2 | HEIGHT: 74 IN

## 2023-04-06 DIAGNOSIS — M17.12 PRIMARY OSTEOARTHRITIS OF LEFT KNEE: ICD-10-CM

## 2023-04-06 DIAGNOSIS — M17.11 PRIMARY OSTEOARTHRITIS OF RIGHT KNEE: ICD-10-CM

## 2023-04-06 PROCEDURE — 20610 DRAIN/INJ JOINT/BURSA W/O US: CPT | Performed by: ORTHOPAEDIC SURGERY

## 2023-04-06 RX ORDER — OMEPRAZOLE 40 MG/1
CAPSULE, DELAYED RELEASE ORAL
COMMUNITY
Start: 2023-03-20

## 2023-04-06 RX ORDER — HYDROCODONE BITARTRATE AND ACETAMINOPHEN 7.5; 325 MG/1; MG/1
1 TABLET ORAL 3 TIMES DAILY
COMMUNITY
Start: 2023-03-30

## 2023-04-06 RX ADMIN — METHYLPREDNISOLONE ACETATE 160 MG: 80 INJECTION, SUSPENSION INTRA-ARTICULAR; INTRALESIONAL; INTRAMUSCULAR; SOFT TISSUE at 15:18

## 2023-04-06 RX ADMIN — LIDOCAINE HYDROCHLORIDE 2 ML: 10 INJECTION, SOLUTION EPIDURAL; INFILTRATION; INTRACAUDAL; PERINEURAL at 15:17

## 2023-04-06 RX ADMIN — METHYLPREDNISOLONE ACETATE 160 MG: 80 INJECTION, SUSPENSION INTRA-ARTICULAR; INTRALESIONAL; INTRAMUSCULAR; SOFT TISSUE at 15:17

## 2023-04-06 RX ADMIN — LIDOCAINE HYDROCHLORIDE 2 ML: 10 INJECTION, SOLUTION EPIDURAL; INFILTRATION; INTRACAUDAL; PERINEURAL at 15:18

## 2023-04-06 NOTE — PROGRESS NOTES
INJECTION    Patient: Júnior Carbajal    YOB: 1965    MRN: [id number]    Chief Complaints: bilateral knee pain    History of Present Illness: Patient returns today for bilateral knee pain.  His pain is located over the medial aspect of the joint.  The pain has been progressive in nature and remains intermittent .  His pain is worsened by rising after sitting. There has been improvement in the past with injections.     This problem is not new to this examiner.     Allergies: No Known Allergies    Medications:   Home Medications:  Current Outpatient Medications on File Prior to Visit   Medication Sig   • gabapentin (NEURONTIN) 600 MG tablet Take 1 tablet by mouth 3 (Three) Times a Day.   • HYDROcodone-acetaminophen (NORCO) 7.5-325 MG per tablet Take 1 tablet by mouth 3 (Three) Times a Day.   • omeprazole (priLOSEC) 40 MG capsule TAKE ONE CAPSULE via nasogastric tube EVERY DAY 30 MINUTES BEFORE morning meal   • omeprazole (priLOSEC) 20 MG capsule TAKE ONE CAPSULE BY MOUTH EVERY DAY 30 MINUTES BEFORE MORNING MEAL     No current facility-administered medications on file prior to visit.     Current Medications:  Scheduled Meds:  PRN Meds:.    I have reviewed the patient's medical history in detail and updated the computerized patient record.  Review and summarization of old records include:    Past Medical History:   Diagnosis Date   • GERD (gastroesophageal reflux disease)    • Low back pain    • Peripheral neuropathy      Past Surgical History:   Procedure Laterality Date   • EPIDURAL BLOCK       Social History     Occupational History   • Not on file   Tobacco Use   • Smoking status: Former     Types: Cigarettes     Quit date: 2008     Years since quitting: 15.2   • Smokeless tobacco: Never   Substance and Sexual Activity   • Alcohol use: Yes     Alcohol/week: 3.0 standard drinks     Types: 3 Cans of beer per week   • Drug use: Never   • Sexual activity: Defer      Social History     Social History  "Narrative   • Not on file     No family history on file.    Review of Systems  Constitutional: Negative for appetite change.   HENT: Negative.    Eyes: Negative.    Respiratory: Negative.    Cardiovascular: Negative.    Gastrointestinal: Negative.    Endocrine: Negative.    Genitourinary: Negative.    Musculoskeletal: See details of exam below.  Skin: Negative.    Allergic/Immunologic: Negative.    Hematological: Negative.    Psychiatric/Behavioral: Negative.          Wt Readings from Last 3 Encounters:   04/06/23 99.8 kg (220 lb)   01/05/23 99.8 kg (220 lb)   03/08/22 103 kg (227 lb)     Ht Readings from Last 3 Encounters:   04/06/23 186.7 cm (73.5\")   01/05/23 186.7 cm (73.5\")   03/08/22 186.7 cm (73.5\")     Body mass index is 28.63 kg/m².  Facility age limit for growth percentiles is 20 years.  Vitals:    04/06/23 1500   Temp: 97.5 °F (36.4 °C)       Physical Exam  Constitutional: Patient is oriented to person, place, and time. Appears well-developed and well-nourished.   HENT:   Head: Normocephalic and atraumatic.   Eyes: Conjunctivae and EOM are normal. Pupils are equal, round, and reactive to light.   Cardiovascular: Normal rate, regular rhythm, normal heart sounds and intact distal pulses.   Pulmonary/Chest: Effort normal and breath sounds normal.   Musculoskeletal:   See detailed exam below   Neurological: Alert and oriented to person, place, and time. No sensory deficit. Coordination normal.   Skin: Skin is warm and dry. Capillary refill takes less than 2 seconds. No rash noted. No erythema.   Psychiatric: Patient has a normal mood and affect. His behavior is normal. Judgment and thought content normal.   Nursing note and vitals reviewed.    Musculoskeletal:    Bilateral knee (varus). Patient has crepitus throughout range of motion. Positive patellar grind test. Mild effusion. Lachman is negative. Pivot shift is negative. Anterior and posterior drawer signs are negative. Significant joint line tenderness is " noted on the medial aspect of the knee. Patient has a varus orientation of the knee. There is fullness and tenderness in the Popliteal fossa. Mild distention of a Popliteal cyst is noted in this location. Range of motion in flexion is from 0-120 degrees. Neurovascular status is intact.  Dorsalis pedis and posterior tibial artery pulses are palpable. Common peroneal nerve function is well preserved. Patient's gait is cautious and antalgic. Skin and soft tissues are mildly swollen, consistent with synovitis and effusion. The patient has a significant limp with the first few steps after starting the gait cycle. Getting out of a chair takes a lot of effort due to pain on knee flexion.       Diagnostics:      Procedure:  Large Joint Arthrocentesis: R knee  Date/Time: 4/6/2023 3:17 PM  Consent given by: patient  Site marked: site marked  Timeout: Immediately prior to procedure a time out was called to verify the correct patient, procedure, equipment, support staff and site/side marked as required   Supporting Documentation  Indications: pain   Procedure Details  Location: knee - R knee  Preparation: Patient was prepped and draped in the usual sterile fashion  Needle size: 25 G  Approach: anteromedial  Medications administered: 160 mg methylPREDNISolone acetate 80 MG/ML; 2 mL lidocaine PF 1% 1 %  Patient tolerance: patient tolerated the procedure well with no immediate complications    Large Joint Arthrocentesis: L knee  Date/Time: 4/6/2023 3:18 PM  Consent given by: patient  Site marked: site marked  Timeout: Immediately prior to procedure a time out was called to verify the correct patient, procedure, equipment, support staff and site/side marked as required   Supporting Documentation  Indications: pain   Procedure Details  Location: knee - L knee  Preparation: Patient was prepped and draped in the usual sterile fashion  Needle size: 25 G  Approach: anteromedial  Medications administered: 160 mg methylPREDNISolone  acetate 80 MG/ML; 2 mL lidocaine PF 1% 1 %  Patient tolerance: patient tolerated the procedure well with no immediate complications          Assessment:   Diagnoses and all orders for this visit:    1. Primary osteoarthritis of left knee  -     Visco Treatment  -     Large Joint Arthrocentesis: L knee    2. Primary osteoarthritis of right knee  -     Visco Treatment  -     Large Joint Arthrocentesis: R knee          Plan:   · Injected patient's bilateral knee joint(s)with Depo-Medrol from an anteromedial approach   · Compression/brace   · Rest, ice, compression, and elevation (RICE) therapy  · OTC Alternate Ibuprofen and Tylenol as needed  · Follow up in 3 month(s)    Date of encounter: 04/06/2023   Eladio Montemayor MD

## 2023-04-07 RX ORDER — LIDOCAINE HYDROCHLORIDE 10 MG/ML
2 INJECTION, SOLUTION EPIDURAL; INFILTRATION; INTRACAUDAL; PERINEURAL
Status: COMPLETED | OUTPATIENT
Start: 2023-04-06 | End: 2023-04-06

## 2023-04-07 RX ORDER — METHYLPREDNISOLONE ACETATE 80 MG/ML
160 INJECTION, SUSPENSION INTRA-ARTICULAR; INTRALESIONAL; INTRAMUSCULAR; SOFT TISSUE
Status: COMPLETED | OUTPATIENT
Start: 2023-04-06 | End: 2023-04-06

## 2023-04-17 ENCOUNTER — TELEPHONE (OUTPATIENT)
Dept: ORTHOPEDIC SURGERY | Facility: CLINIC | Age: 58
End: 2023-04-17
Payer: COMMERCIAL

## 2023-04-17 DIAGNOSIS — M17.11 PRIMARY OSTEOARTHRITIS OF RIGHT KNEE: ICD-10-CM

## 2023-04-17 DIAGNOSIS — M17.12 PRIMARY OSTEOARTHRITIS OF LEFT KNEE: Primary | ICD-10-CM

## 2023-04-17 RX ORDER — TRAMADOL HYDROCHLORIDE 50 MG/1
50 TABLET ORAL EVERY 12 HOURS PRN
Qty: 40 TABLET | Refills: 0 | Status: SHIPPED | OUTPATIENT
Start: 2023-04-17

## 2023-04-17 NOTE — TELEPHONE ENCOUNTER
Dr. Montemayor's response: Can you please bring the patient back in the office to be seen by Saw?  Also call him in some Ultram 50 mg tab 1 p.o. twice daily 40 pills and send me the prescription electronically.  He can use an Ace wrap and an ice pack on the knee as well.  Thank you.  Also please remind the patient that he was the 1 that insisted that the student should be allowed to administer the injection on the left knee just so that he remembers that fact as well.  I was present by the side of the student with my hand on the syringe at all times as well.  Thank you

## 2023-04-17 NOTE — TELEPHONE ENCOUNTER
Medication pended    Patient voiced understanding and states he would let the student inject him again, everyone has to learn somehow.

## 2023-04-17 NOTE — TELEPHONE ENCOUNTER
Provider:  DR. HEATHER SOSA  Caller:  TREY MONROE  Relationship to Patient: SELF  Pharmacy:  [facility] [id number]  Phone Number:  [phone]  Reason for Call: PATIENT CALLED STATING HE WAS SEEN FOR BILATERAL KNEE INJECTIONS ON 4/6/23. PATIENT SAYS DR. SOSA HAD A STUDENT DOCTOR WITH HIM AND DR. SOSA INJECTED PATIENT'S RIGHT KNEE, THEN LET THE STUDENT DOCTOR INJECT THE LEFT KNEE.  BUT THEN DR. SOSA HAD TO TAKE OVER DUE TO PAIN. PATIENT SAYS HIS LEFT KNEE HAS BEEN HURTING BAD EVER SINCE AND GETTING WORSE.    PATIENT WOULD LIKE A CALL BACK ASAP

## 2023-07-10 PROBLEM — M25.561 CHRONIC PAIN OF RIGHT KNEE: Status: ACTIVE | Noted: 2023-07-10

## 2023-07-10 PROBLEM — M25.562 CHRONIC PAIN OF LEFT KNEE: Status: ACTIVE | Noted: 2023-07-10

## 2023-07-10 PROBLEM — G89.29 CHRONIC PAIN OF RIGHT KNEE: Status: ACTIVE | Noted: 2023-07-10

## 2023-07-28 DIAGNOSIS — M17.11 PRIMARY OSTEOARTHRITIS OF RIGHT KNEE: Primary | ICD-10-CM

## 2023-07-28 DIAGNOSIS — M17.12 PRIMARY OSTEOARTHRITIS OF LEFT KNEE: ICD-10-CM

## 2023-08-24 ENCOUNTER — OFFICE VISIT (OUTPATIENT)
Dept: GASTROENTEROLOGY | Facility: CLINIC | Age: 58
End: 2023-08-24
Payer: COMMERCIAL

## 2023-08-24 ENCOUNTER — TELEPHONE (OUTPATIENT)
Dept: GASTROENTEROLOGY | Facility: CLINIC | Age: 58
End: 2023-08-24
Payer: COMMERCIAL

## 2023-08-24 VITALS
HEART RATE: 87 BPM | SYSTOLIC BLOOD PRESSURE: 125 MMHG | WEIGHT: 222.4 LBS | BODY MASS INDEX: 28.54 KG/M2 | HEIGHT: 74 IN | DIASTOLIC BLOOD PRESSURE: 82 MMHG

## 2023-08-24 DIAGNOSIS — R19.5 POSITIVE COLORECTAL CANCER SCREENING USING COLOGUARD TEST: ICD-10-CM

## 2023-08-24 DIAGNOSIS — K44.9 HIATAL HERNIA: ICD-10-CM

## 2023-08-24 DIAGNOSIS — K22.70 BARRETT'S ESOPHAGUS WITHOUT DYSPLASIA: Primary | ICD-10-CM

## 2023-08-24 DIAGNOSIS — K21.00 GASTROESOPHAGEAL REFLUX DISEASE WITH ESOPHAGITIS WITHOUT HEMORRHAGE: ICD-10-CM

## 2023-08-24 DIAGNOSIS — Z80.0 FH: THROAT CANCER: ICD-10-CM

## 2023-08-24 PROCEDURE — 99214 OFFICE O/P EST MOD 30 MIN: CPT | Performed by: NURSE PRACTITIONER

## 2023-08-24 RX ORDER — ATORVASTATIN CALCIUM 20 MG/1
TABLET, FILM COATED ORAL
COMMUNITY
Start: 2023-08-17

## 2023-08-24 RX ORDER — TAMSULOSIN HYDROCHLORIDE 0.4 MG/1
CAPSULE ORAL
COMMUNITY
Start: 2023-08-17

## 2023-08-24 RX ORDER — LEFLUNOMIDE 20 MG/1
TABLET ORAL
COMMUNITY
Start: 2023-08-02

## 2023-08-24 RX ORDER — SODIUM, POTASSIUM,MAG SULFATES 17.5-3.13G
2 SOLUTION, RECONSTITUTED, ORAL ORAL TAKE AS DIRECTED
Qty: 177 ML | Refills: 0 | Status: SHIPPED | OUTPATIENT
Start: 2023-08-24

## 2023-08-24 NOTE — TELEPHONE ENCOUNTER
Spoke with patient in regards to appt today at 3:30 I advised we had early openings and wanted to know if he wanted to come in earlier. Pt states he does not get off work until 2:30 and his wife also wishes to be at the appt. Pt states he will try his best to be here early but cannot guarantee he can.

## 2023-08-24 NOTE — PROGRESS NOTES
Chief Complaint   Colon Cancer Screening (Cologaurd )    History of Present Illness       Júnior Carbajal is a 58 y.o. male who presents today accompanied by his wife to [facility] for follow-up for positive Cologuard.  He is new to me today.    He underwent a Cologuard test on 8/9/2023.  It came back positive    He underwent EGD with Dr. Hallman on 5/4/2021.  EGD showed hiatal hernia.  Schatzki ring which was dilated and an irregular Z-line.  Tertiary contractions were also visualized.  Path was positive for reflux esophagitis and focal intestinal metaplasia.  Positive for Estrada's esophagus.  Recall EGD in 1 year for surveillance of Estrada's esophagus.    GERD--- omeprazole 40 mg daily. Denies any breakthrough reflux.     He admits his bowels move everyday. Better after coffee. He denies any rectal bleeding or melena. Will occasionally have dark stools. Will occasionally take NSAIDs. Good appetite. Weight is stable.     Screening colonoscopy-never had     GI family history----Father with throat cancer.   Results       Result Review :                             Past Medical History       Past Medical History:   Diagnosis Date    Estrada esophagus 2021    GERD (gastroesophageal reflux disease)     Hyperlipidemia 08/17/23    Low back pain     Peripheral neuropathy        Past Surgical History:   Procedure Laterality Date    EPIDURAL BLOCK      UPPER GASTROINTESTINAL ENDOSCOPY  2021         Current Outpatient Medications:     atorvastatin (LIPITOR) 20 MG tablet, , Disp: , Rfl:     gabapentin (NEURONTIN) 600 MG tablet, Take 1 tablet by mouth 3 (Three) Times a Day., Disp: , Rfl:     leflunomide (ARAVA) 20 MG tablet, , Disp: , Rfl:     omeprazole (priLOSEC) 40 MG capsule, TAKE ONE CAPSULE via nasogastric tube EVERY DAY 30 MINUTES BEFORE morning meal, Disp: , Rfl:     tamsulosin (FLOMAX) 0.4 MG capsule 24 hr capsule, , Disp: , Rfl:     sodium-potassium-magnesium sulfates (Suprep Bowel  "Prep Kit) 17.5-3.13-1.6 GM/177ML solution oral solution, Take 2 bottles by mouth Take As Directed., Disp: 177 mL, Rfl: 0     No Known Allergies    Family History   Problem Relation Age of Onset    Esophageal cancer Father         Social History     Social History Narrative    Not on file       Objective       Review of Systems   Constitutional:  Negative for appetite change, fatigue, fever, unexpected weight gain and unexpected weight loss.   HENT:  Negative for trouble swallowing.    Respiratory:  Negative for cough, choking, chest tightness, shortness of breath, wheezing and stridor.    Cardiovascular:  Negative for chest pain, palpitations and leg swelling.   Gastrointestinal:  Negative for abdominal distention, abdominal pain, anal bleeding, blood in stool, constipation, diarrhea, nausea, rectal pain, vomiting, GERD and indigestion.      Vital Signs:   /82 (BP Location: Left arm, Patient Position: Sitting, Cuff Size: Adult)   Pulse 87   Ht 188 cm (74\")   Wt 101 kg (222 lb 6.4 oz)   BMI 28.55 kg/mý       Physical Exam  Constitutional:       General: He is not in acute distress.     Appearance: He is well-developed. He is not ill-appearing.   HENT:      Head: Normocephalic.   Eyes:      Pupils: Pupils are equal, round, and reactive to light.   Cardiovascular:      Rate and Rhythm: Normal rate and regular rhythm.      Heart sounds: Normal heart sounds.   Pulmonary:      Effort: Pulmonary effort is normal.      Breath sounds: Normal breath sounds.   Abdominal:      General: Bowel sounds are normal. There is no distension.      Palpations: Abdomen is soft. There is no mass.      Tenderness: There is no abdominal tenderness. There is no guarding or rebound.      Hernia: No hernia is present.   Musculoskeletal:         General: Normal range of motion.   Skin:     General: Skin is warm and dry.   Neurological:      Mental Status: He is alert and oriented to person, place, and time.   Psychiatric:         " Speech: Speech normal.         Behavior: Behavior normal.         Judgment: Judgment normal.         Assessment & Plan          Assessment and Plan    Diagnoses and all orders for this visit:    1. Estrada's esophagus without dysplasia (Primary)  -     sodium-potassium-magnesium sulfates (Suprep Bowel Prep Kit) 17.5-3.13-1.6 GM/177ML solution oral solution; Take 2 bottles by mouth Take As Directed.  Dispense: 177 mL; Refill: 0  -     Case Request; Standing  -     Follow Anesthesia Guidelines / Protocol; Future  -     Case Request    2. Gastroesophageal reflux disease with esophagitis without hemorrhage  -     sodium-potassium-magnesium sulfates (Suprep Bowel Prep Kit) 17.5-3.13-1.6 GM/177ML solution oral solution; Take 2 bottles by mouth Take As Directed.  Dispense: 177 mL; Refill: 0  -     Case Request; Standing  -     Follow Anesthesia Guidelines / Protocol; Future  -     Case Request    3. Hiatal hernia  -     sodium-potassium-magnesium sulfates (Suprep Bowel Prep Kit) 17.5-3.13-1.6 GM/177ML solution oral solution; Take 2 bottles by mouth Take As Directed.  Dispense: 177 mL; Refill: 0  -     Case Request; Standing  -     Follow Anesthesia Guidelines / Protocol; Future  -     Case Request    4. Positive colorectal cancer screening using Cologuard test  -     sodium-potassium-magnesium sulfates (Suprep Bowel Prep Kit) 17.5-3.13-1.6 GM/177ML solution oral solution; Take 2 bottles by mouth Take As Directed.  Dispense: 177 mL; Refill: 0  -     Case Request; Standing  -     Follow Anesthesia Guidelines / Protocol; Future  -     Case Request    5. FH: throat cancer  -     sodium-potassium-magnesium sulfates (Suprep Bowel Prep Kit) 17.5-3.13-1.6 GM/177ML solution oral solution; Take 2 bottles by mouth Take As Directed.  Dispense: 177 mL; Refill: 0  -     Case Request; Standing  -     Follow Anesthesia Guidelines / Protocol; Future  -     Case Request    Other orders  -     Verify NPO; Standing  -     Verify Bowel Prep  Was Successful; Standing  -     Give Tap Water Enema If Bowel Prep Insufficient; Standing    Reviewed medical history with him today.  GERD seems well controlled on omeprazole 40 mg daily.  He is overdue for surveillance EGD for his Estrada's esophagus.  He is also overdue for screening colonoscopy as well.  Given his most recent positive Cologuard it makes sense to go ahead and get him on the scope schedule for an EGD and colonoscopy to be done at the same time.  Patient is agreeable to doing both scopes.  Bowels seem to be moving well currently.  Appetite good.  Weight stable.  Patient to call the office with any issues.  Patient to follow-up with me after his scopes.  Patient is agreeable to the plan.    Surgical Risk and Benefits discussed: Possible risks/complications, benefits, and alternatives to surgical or invasive procedure have been explained to patient and/or legal guardian; risks include bleeding, infection, and perforation. Patient has been evaluated and can tolerate anesthesia and/or sedation. Risks, benefits, and alternatives to anesthesia and sedation have been explained to patient and/or legal guardian.            Follow Up       Follow Up   Return for F/U AFTER PROCEDURE.  Patient was given instructions and counseling regarding his condition or for health maintenance advice. Please see specific information pulled into the AVS if appropriate.

## 2023-08-31 ENCOUNTER — TELEPHONE (OUTPATIENT)
Dept: GASTROENTEROLOGY | Facility: CLINIC | Age: 58
End: 2023-08-31
Payer: COMMERCIAL

## 2023-08-31 NOTE — TELEPHONE ENCOUNTER
Patient contacted the office about needing to reschedule scopes from 09/22/2023 due to conflict of schedule.  Rescheduled patient to November 3 arrival time of 10:30 am.

## 2023-09-19 NOTE — PROGRESS NOTES
Chief Complaint: Nocturia and urinary hesitancy    Subjective         History of Present Illness  Júnior Carbajal is a 58 y.o. male presents to [facility] to be seen for elevated PSA/nocturia/hesitancy.    Patient presents reporting he has been having difficulty with nocturia for several months. He reports that he doesn't go much during the day. He has been on tamsulosin for less than 1 month.     Frequency- denies during the day, only at night    Urgency- admits    Incontinence- denies    Nocturia- 4-5    Stream- weak    Perineal pain- denies    Dysuria- denies    GH- denies, but urine is dark    History of stones- 2, passed     surgeries- denies    Family history of  malignancy- denies    Cardiopulmonary- denies    Anticoagulants- denies    Smoker- denies    PSA  7/20/2023 5.86    Percent free PSA  7/20/2023 6.14%    Free PSA  7/20/2023 0.36    Objective     Past Medical History:   Diagnosis Date    Estrada esophagus 2021    GERD (gastroesophageal reflux disease)     Hyperlipidemia 08/17/23    Low back pain     Peripheral neuropathy        Past Surgical History:   Procedure Laterality Date    EPIDURAL BLOCK      UPPER GASTROINTESTINAL ENDOSCOPY  2021         Current Outpatient Medications:     atorvastatin (LIPITOR) 20 MG tablet, , Disp: , Rfl:     gabapentin (NEURONTIN) 600 MG tablet, Take 1 tablet by mouth 3 (Three) Times a Day., Disp: , Rfl:     leflunomide (ARAVA) 20 MG tablet, , Disp: , Rfl:     omeprazole (priLOSEC) 40 MG capsule, TAKE ONE CAPSULE via nasogastric tube EVERY DAY 30 MINUTES BEFORE morning meal, Disp: , Rfl:     sodium-potassium-magnesium sulfates (Suprep Bowel Prep Kit) 17.5-3.13-1.6 GM/177ML solution oral solution, Take 2 bottles by mouth Take As Directed., Disp: 177 mL, Rfl: 0    tamsulosin (FLOMAX) 0.4 MG capsule 24 hr capsule, , Disp: , Rfl:     No Known Allergies     Family History   Problem Relation Age of Onset    Esophageal cancer Father        Social History  "    Socioeconomic History    Marital status:    Tobacco Use    Smoking status: Former     Packs/day: 1.00     Years: 25.00     Pack years: 25.00     Types: Cigarettes     Start date: 1984     Quit date: 2008     Years since quittin.8     Passive exposure: Past    Smokeless tobacco: Never   Vaping Use    Vaping Use: Never used   Substance and Sexual Activity    Alcohol use: Yes     Alcohol/week: 21.0 standard drinks     Types: 21 Cans of beer per week     Comment: 3 drinks per day    Drug use: Not Currently     Types: Amphetamines, Marijuana    Sexual activity: Yes     Partners: Female     Birth control/protection: Same-sex partner       Vital Signs:   Resp 16   Ht 188 cm (74\")   Wt 98 kg (216 lb)   BMI 27.73 kg/m²      Physical Exam  Vitals reviewed.   Constitutional:       Appearance: Normal appearance.   Neurological:      General: No focal deficit present.      Mental Status: He is alert and oriented to person, place, and time.   Psychiatric:         Mood and Affect: Mood normal.         Behavior: Behavior normal.        Result Review :   The following data was reviewed by: JUDITH Rosa on 2023:  Results for orders placed or performed in visit on 23   Bladder Scan   Result Value Ref Range    Urine Volume 0    POC Urinalysis Dipstick, Automated    Specimen: Urine   Result Value Ref Range    Color Yellow Yellow, Straw, Dark Yellow, Blank    Clarity, UA Clear Clear    Specific Gravity  1.025 1.005 - 1.030    pH, Urine 5.5 5.0 - 8.0    Leukocytes Negative Negative    Nitrite, UA Negative Negative    Protein, POC 30 mg/dL (A) Negative mg/dL    Glucose, UA Negative Negative mg/dL    Ketones, UA Trace (A) Negative    Urobilinogen, UA Normal Normal, 0.2 E.U./dL    Bilirubin Small (1+) (A) Negative    Blood, UA Trace (A) Negative    Lot Number [id number]     Expiration Date            Bladder Scan interpretation 2023    Estimation of residual urine via BVI 3000 " [facility] Bladder Scan  MA/nurse performing: Amy BOWLES MA  Residual Urine: 0 ml  Indication: Nocturia    Urinary hesitancy    Elevated prostate specific antigen (PSA)    Benign prostatic hyperplasia with lower urinary tract symptoms, symptom details unspecified   Position: Supine  Examination: Incremental scanning of the suprapubic area using 2.0 MHz transducer using copious amounts of acoustic gel.   Findings: An anechoic area was demonstrated which represented the bladder, with measurement of residual urine as noted. I inspected this myself. In that the residual urine was insignificant, refer to plan for treatment and plan necessary at this time.         Procedures        Assessment and Plan    Diagnoses and all orders for this visit:    1. Nocturia (Primary)  -     Bladder Scan  -     POC Urinalysis Dipstick, Automated    2. Urinary hesitancy  -     Bladder Scan  -     POC Urinalysis Dipstick, Automated    3. Elevated prostate specific antigen (PSA)  -     MRI Prostate With & Without Contrast; Future    4. Benign prostatic hyperplasia with lower urinary tract symptoms, symptom details unspecified    Elevated PSA-lab results discussed at length with patient.  Discussed several options with patient including a standard prostate biopsy or obtaining an MRI of prostate with possible subsequent fusion biopsy as results dictate.    Patient does wish to proceed with MRI of the prostate with biopsy if indicated.      Nocturia- Discussed with patient that nocturia is a common condition that is multifactorial in nature and can be difficult to treat, unlikely to completely irradicate, and management is dictated by patient motivation to improve and cope with symptoms.  discussed with patient, recommended behavioral modifications including fluid management, limiting fluids prior to sleep, and voiding immediately prior to sleep. Recommended that patient lay supine in the afternoon with feet above heart level to assist wtih  mobilizing dependent edema which typically occurs while supine during sleep.  Also discussed with patient that since he has been diagnosed with sleep apnea and is not using his CPAP that this may be contributing to his nocturia.  I did advise that he follow-up with his provider to discuss options for treatment.    I did advise him to continue with his tamsulosin.        Follow Up   Return for 1 week after MRI.  Patient was given instructions and counseling regarding his condition or for health maintenance advice. Please see specific information pulled into the AVS if appropriate.         This document has been electronically signed by JUDITH Rosa  September 20, 2023 15:19 EDT

## 2023-09-20 ENCOUNTER — OFFICE VISIT (OUTPATIENT)
Dept: UROLOGY | Facility: CLINIC | Age: 58
End: 2023-09-20
Payer: COMMERCIAL

## 2023-09-20 VITALS — WEIGHT: 216 LBS | RESPIRATION RATE: 16 BRPM | BODY MASS INDEX: 27.72 KG/M2 | HEIGHT: 74 IN

## 2023-09-20 DIAGNOSIS — N40.1 BENIGN PROSTATIC HYPERPLASIA WITH LOWER URINARY TRACT SYMPTOMS, SYMPTOM DETAILS UNSPECIFIED: ICD-10-CM

## 2023-09-20 DIAGNOSIS — R35.1 NOCTURIA: Primary | ICD-10-CM

## 2023-09-20 DIAGNOSIS — R97.20 ELEVATED PROSTATE SPECIFIC ANTIGEN (PSA): ICD-10-CM

## 2023-09-20 DIAGNOSIS — R39.11 URINARY HESITANCY: ICD-10-CM

## 2023-09-20 LAB
BILIRUB BLD-MCNC: ABNORMAL MG/DL
CLARITY, POC: CLEAR
COLOR UR: YELLOW
EXPIRATION DATE: ABNORMAL
GLUCOSE UR STRIP-MCNC: NEGATIVE MG/DL
KETONES UR QL: ABNORMAL
LEUKOCYTE EST, POC: NEGATIVE
Lab: ABNORMAL
NITRITE UR-MCNC: NEGATIVE MG/ML
PH UR: 5.5 [PH] (ref 5–8)
PROT UR STRIP-MCNC: ABNORMAL MG/DL
RBC # UR STRIP: ABNORMAL /UL
SP GR UR: 1.02 (ref 1–1.03)
URINE VOLUME: 0
UROBILINOGEN UR QL: NORMAL

## 2023-09-20 PROCEDURE — 99203 OFFICE O/P NEW LOW 30 MIN: CPT | Performed by: NURSE PRACTITIONER

## 2023-09-20 PROCEDURE — 81003 URINALYSIS AUTO W/O SCOPE: CPT | Performed by: NURSE PRACTITIONER

## 2023-09-22 ENCOUNTER — TELEPHONE (OUTPATIENT)
Dept: GASTROENTEROLOGY | Facility: CLINIC | Age: 58
End: 2023-09-22
Payer: COMMERCIAL

## 2023-09-22 NOTE — TELEPHONE ENCOUNTER
Hub staff attempted to follow warm transfer process and was unsuccessful     Caller: LUCY MONROE    Relationship to patient: WIFE    Best call back number: [phone]     Patient is needing: MR MONROE WOULD LIKE TO GET A SOONER APPT FOR HIS PROCEDURE SCHEDULED FOR 11/3/23. PLEASE REVIEW AND ADVISE.    OK TO CALL BACK ANYTIME. OK TO LEAVE .

## 2023-09-25 NOTE — PRE-PROCEDURE INSTRUCTIONS
"Instructed on date and arrival time of 0930. Come to entrance \"C\". Must have  over age 18 to drive home.  May have two visitors; however, children under 12 must stay in waiting room.  Discussed clear liquid diet (no red or purple) and bowel prep.  May take medications as usual except for blood thinners, diabetic medications, and weight loss medications.  Bring list of medications.  Verbalized understanding of instructions given.  Instructed to call for questions or concerns.  "

## 2023-10-01 ENCOUNTER — ANESTHESIA EVENT (OUTPATIENT)
Dept: GASTROENTEROLOGY | Facility: HOSPITAL | Age: 58
End: 2023-10-01
Payer: COMMERCIAL

## 2023-10-02 ENCOUNTER — HOSPITAL ENCOUNTER (OUTPATIENT)
Facility: HOSPITAL | Age: 58
Setting detail: HOSPITAL OUTPATIENT SURGERY
Discharge: HOME | End: 2023-10-02
Attending: INTERNAL MEDICINE | Admitting: INTERNAL MEDICINE
Payer: COMMERCIAL

## 2023-10-02 ENCOUNTER — ANESTHESIA (OUTPATIENT)
Dept: GASTROENTEROLOGY | Facility: HOSPITAL | Age: 58
End: 2023-10-02
Payer: COMMERCIAL

## 2023-10-02 VITALS
DIASTOLIC BLOOD PRESSURE: 92 MMHG | HEART RATE: 67 BPM | WEIGHT: 215.61 LBS | BODY MASS INDEX: 27.68 KG/M2 | OXYGEN SATURATION: 94 % | SYSTOLIC BLOOD PRESSURE: 112 MMHG | RESPIRATION RATE: 18 BRPM | TEMPERATURE: 98.7 F

## 2023-10-02 DIAGNOSIS — Z80.0 FH: THROAT CANCER: ICD-10-CM

## 2023-10-02 DIAGNOSIS — R19.5 POSITIVE COLORECTAL CANCER SCREENING USING COLOGUARD TEST: ICD-10-CM

## 2023-10-02 DIAGNOSIS — K22.70 BARRETT'S ESOPHAGUS WITHOUT DYSPLASIA: ICD-10-CM

## 2023-10-02 DIAGNOSIS — K21.00 GASTROESOPHAGEAL REFLUX DISEASE WITH ESOPHAGITIS WITHOUT HEMORRHAGE: ICD-10-CM

## 2023-10-02 DIAGNOSIS — K44.9 HIATAL HERNIA: ICD-10-CM

## 2023-10-02 PROCEDURE — 45385 COLONOSCOPY W/LESION REMOVAL: CPT | Performed by: INTERNAL MEDICINE

## 2023-10-02 PROCEDURE — 88305 TISSUE EXAM BY PATHOLOGIST: CPT | Performed by: INTERNAL MEDICINE

## 2023-10-02 PROCEDURE — 45380 COLONOSCOPY AND BIOPSY: CPT | Performed by: INTERNAL MEDICINE

## 2023-10-02 PROCEDURE — 25010000002 PROPOFOL 10 MG/ML EMULSION: Performed by: NURSE ANESTHETIST, CERTIFIED REGISTERED

## 2023-10-02 PROCEDURE — 43239 EGD BIOPSY SINGLE/MULTIPLE: CPT | Performed by: INTERNAL MEDICINE

## 2023-10-02 RX ORDER — LIDOCAINE HYDROCHLORIDE 20 MG/ML
INJECTION, SOLUTION EPIDURAL; INFILTRATION; INTRACAUDAL; PERINEURAL AS NEEDED
Status: DISCONTINUED | OUTPATIENT
Start: 2023-10-02 | End: 2023-10-02 | Stop reason: SURG

## 2023-10-02 RX ORDER — SODIUM CHLORIDE, SODIUM LACTATE, POTASSIUM CHLORIDE, CALCIUM CHLORIDE 600; 310; 30; 20 MG/100ML; MG/100ML; MG/100ML; MG/100ML
30 INJECTION, SOLUTION INTRAVENOUS CONTINUOUS
Status: DISCONTINUED | OUTPATIENT
Start: 2023-10-02 | End: 2023-10-02 | Stop reason: HOSPADM

## 2023-10-02 RX ORDER — PROPOFOL 10 MG/ML
VIAL (ML) INTRAVENOUS AS NEEDED
Status: DISCONTINUED | OUTPATIENT
Start: 2023-10-02 | End: 2023-10-02 | Stop reason: SURG

## 2023-10-02 RX ADMIN — SODIUM CHLORIDE, POTASSIUM CHLORIDE, SODIUM LACTATE AND CALCIUM CHLORIDE 30 ML/HR: 600; 310; 30; 20 INJECTION, SOLUTION INTRAVENOUS at 10:04

## 2023-10-02 RX ADMIN — PROPOFOL 200 MCG/KG/MIN: 10 INJECTION, EMULSION INTRAVENOUS at 10:45

## 2023-10-02 RX ADMIN — LIDOCAINE HYDROCHLORIDE 50 MG: 20 INJECTION, SOLUTION EPIDURAL; INFILTRATION; INTRACAUDAL; PERINEURAL at 10:45

## 2023-10-02 RX ADMIN — PROPOFOL 100 MG: 10 INJECTION, EMULSION INTRAVENOUS at 10:45

## 2023-10-02 NOTE — ANESTHESIA POSTPROCEDURE EVALUATION
Patient: Júnior Carbajal    Procedure Summary       Date: 10/02/23 Room / Location: [facility] 3 / [facility]    Anesthesia Start: 1042 Anesthesia Stop: 1120    Procedures:       ESOPHAGOGASTRODUODENOSCOPY with biopsies      COLONOSCOPY WITH BIOPSY/COLD/HOT SNARE POLYPECTOMIES Diagnosis:       Estrada's esophagus without dysplasia      Gastroesophageal reflux disease with esophagitis without hemorrhage      Hiatal hernia      Positive colorectal cancer screening using Cologuard test      FH: throat cancer      (Estrada's esophagus without dysplasia [K22.70])      (Gastroesophageal reflux disease with esophagitis without hemorrhage [K21.00])      (Hiatal hernia [K44.9])      (Positive colorectal cancer screening using Cologuard test [R19.5])      (FH: throat cancer [Z80.0])    Surgeons: Frances Hallman MD Provider: Nancy oHward CRNA    Anesthesia Type: general ASA Status: 2            Anesthesia Type: general    Vitals  Vitals Value Taken Time   /92 10/02/23 1143   Temp 37.1 °C (98.7 °F) 10/02/23 1124   Pulse 67 10/02/23 1144   Resp 18 10/02/23 1140   SpO2 94 % 10/02/23 1140   Vitals shown include unvalidated device data.        Post Anesthesia Care and Evaluation    Post-procedure mental status: acceptable.  Pain management: satisfactory to patient    Airway patency: patent  Anesthetic complications: No anesthetic complications    Cardiovascular status: acceptable  Respiratory status: acceptable    Comments: Per chart review

## 2023-10-02 NOTE — H&P
Pre Procedure History & Physical    Chief Complaint:   F/u Estrada's esophagus, positive Cologuard    Subjective     HPI:   57 yo M here for f/u Estrada's esophagus, positive Cologuard.    Past Medical History:   Past Medical History:   Diagnosis Date    Estrada esophagus 2021    GERD (gastroesophageal reflux disease)     Hyperlipidemia 08/17/23    Low back pain     Peripheral neuropathy        Past Surgical History:  Past Surgical History:   Procedure Laterality Date    EPIDURAL BLOCK      KNEE SURGERY Left     Menicus    UPPER GASTROINTESTINAL ENDOSCOPY  2021       Family History:  Family History   Problem Relation Age of Onset    Esophageal cancer Father        Social History:   reports that he quit smoking about 14 years ago. His smoking use included cigarettes. He started smoking about 39 years ago. He has a 25.00 pack-year smoking history. He has been exposed to tobacco smoke. He has never used smokeless tobacco. He reports current alcohol use of about 21.0 standard drinks per week. He reports that he does not currently use drugs after having used the following drugs: Amphetamines and Marijuana.    Medications:   Medications Prior to Admission   Medication Sig Dispense Refill Last Dose    atorvastatin (LIPITOR) 20 MG tablet    10/1/2023    gabapentin (NEURONTIN) 600 MG tablet Take 1 tablet by mouth 3 (Three) Times a Day.   10/2/2023    leflunomide (ARAVA) 20 MG tablet    10/1/2023    omeprazole (priLOSEC) 40 MG capsule TAKE ONE CAPSULE via nasogastric tube EVERY DAY 30 MINUTES BEFORE morning meal   10/1/2023    tamsulosin (FLOMAX) 0.4 MG capsule 24 hr capsule    10/1/2023       Allergies:  Patient has no known allergies.    ROS:    Pertinent items are noted in HPI     Objective     Blood pressure 127/98, pulse 73, temperature 96.9 °F (36.1 °C), temperature source Temporal, resp. rate 16, weight 97.8 kg (215 lb 9.8 oz), SpO2 96 %.    Physical Exam   Constitutional: Pt is oriented to person, place, and time and  well-developed, well-nourished, and in no distress.   Mouth/Throat: Oropharynx is clear and moist.   Neck: Normal range of motion.   Cardiovascular: Normal rate, regular rhythm and normal heart sounds.    Pulmonary/Chest: Effort normal and breath sounds normal.   Abdominal: Soft. Nontender  Skin: Skin is warm and dry.   Psychiatric: Mood, memory, affect and judgment normal.     Assessment & Plan     Diagnosis:  F/u Estrada's esophagus, positive Cologuard    Anticipated Surgical Procedure:  EGD/colonoscopy    The risks, benefits, and alternatives of this procedure have been discussed with the patient or the responsible party- the patient understands and agrees to proceed.

## 2023-10-02 NOTE — ANESTHESIA PREPROCEDURE EVALUATION
Anesthesia Evaluation     Patient summary reviewed and Nursing notes reviewed   NPO Solid Status: > 8 hours  NPO Liquid Status: > 4 hours           Airway   Mallampati: II  TM distance: >3 FB  Neck ROM: full  No difficulty expected  Dental - normal exam     Pulmonary - normal exam    breath sounds clear to auscultation  (+) ,sleep apnea    ROS comment: Dx with OBINNA in past but does not use CPAP d/t claustrophobia   Cardiovascular   Exercise tolerance: good (4-7 METS)    Rhythm: regular  Rate: normal    (+) hyperlipidemia      Neuro/Psych  GI/Hepatic/Renal/Endo    (+) obesity, hiatal hernia, GERD well controlled    Musculoskeletal     Abdominal    Substance History      OB/GYN          Other   arthritis,                     Anesthesia Plan    ASA 2     general   total IV anesthesia  intravenous induction     Anesthetic plan, risks, benefits, and alternatives have been provided, discussed and informed consent has been obtained with: patient and spouse/significant other.  Pre-procedure education provided  Plan discussed with CRNA.    CODE STATUS:

## 2023-10-03 LAB
CYTO UR: NORMAL
LAB AP CASE REPORT: NORMAL
LAB AP CLINICAL INFORMATION: NORMAL
PATH REPORT.FINAL DX SPEC: NORMAL
PATH REPORT.GROSS SPEC: NORMAL

## 2023-10-05 ENCOUNTER — TELEPHONE (OUTPATIENT)
Dept: GASTROENTEROLOGY | Facility: CLINIC | Age: 58
End: 2023-10-05
Payer: COMMERCIAL

## 2023-10-05 NOTE — TELEPHONE ENCOUNTER
----- Message from JUDITH Scott sent at 10/4/2023  2:11 PM EDT -----  Please let pt. Know that biopsies are benign.  Recommend repeat EGD in 3 years for surveillance of parmar's.  Colon biopsies benign.  Place in recall for repeat colonoscopy in 3 years.

## 2023-10-12 ENCOUNTER — HOSPITAL ENCOUNTER (OUTPATIENT)
Facility: HOSPITAL | Age: 58
Discharge: HOME | End: 2023-10-12
Admitting: NURSE PRACTITIONER
Payer: COMMERCIAL

## 2023-10-12 DIAGNOSIS — R97.20 ELEVATED PROSTATE SPECIFIC ANTIGEN (PSA): ICD-10-CM

## 2023-10-12 PROCEDURE — 72197 MRI PELVIS W/O & W/DYE: CPT

## 2023-10-12 PROCEDURE — 0 GADOBENATE DIMEGLUMINE 529 MG/ML SOLUTION: Performed by: NURSE PRACTITIONER

## 2023-10-12 PROCEDURE — A9577 INJ MULTIHANCE: HCPCS | Performed by: NURSE PRACTITIONER

## 2023-10-12 RX ADMIN — GADOBENATE DIMEGLUMINE 20 ML: 529 INJECTION, SOLUTION INTRAVENOUS at 17:45

## 2023-10-24 NOTE — PROGRESS NOTES
Chief Complaint: Elevated PSA    Subjective         History of Present Illness  Júnior Carbajal is a 58 y.o. male presents to [facility] UROLOG[facility] to be seen for follow-up MRI.    Patient was previously seen by me with last visit on 9/20/2023 for elevated PSA.  We did schedule him for an MRI of the prostate.  He is here for follow-up of those results.    He is still having nocturia 4-5 per night.     MRI OF THE PROSTATE WITH AND WITHOUT CONTRAST.     TECHNIQUE: Multiplanar, multi-sequential MRI of the pelvis/prostate  performed before and after the uneventful IV administration of 20 mL of  MultiHance as intravenous contrast.     CLINICAL HISTORY:  58-year-old male with elevated PSA.      Most recent PSA = 5.86 ng/ml on July 2023  Findings:     Prostate size and volume: 4.1 x 5.3 x 6.1 cm and 69.4 mL     PSA density: 0.09 ng/mL/cc     Length of membranous urethra: Approximately 1 cm     Post-biopsy hemorrhage: None.     Prostate:  There are no findings of suspicious prostatic lesion by PI-RADS  criteria. There is mild-to-moderate BPH.     Capsular margin and neurovascular bundle: Unremarkable.     Seminal vesicles: Unremarkable.     Lymph nodes: No pathologically enlarged pelvic or inguinal lymph nodes  by size criteria.     Bones: Unremarkable.     Other: The bladder is trabeculated.     IMPRESSION:  1.   No findings of suspicious prostatic lesion by PI-RADS criteria.  2.  Mild-to-moderate BPH.  3.  Other findings as above.        This report was finalized on 10/23/2023 12:22 PM by Dr. Issa Moise M.D on Workstation: [id number]         Previous 9/20/2023:     Patient presents reporting he has been having difficulty with nocturia for several months. He reports that he doesn't go much during the day. He has been on tamsulosin for less than 1 month.      Frequency- denies during the day, only at night   Urgency- admits   Incontinence- denies   Nocturia- 4-5   Stream- weak   Perineal pain- denies    Dysuria- denies   GH- denies, but urine is dark   History of stones- 2, passed    surgeries- denies   Family history of  malignancy- denies   Cardiopulmonary- denies   Anticoagulants- denies   Smoker- denies     PSA  7/20/2023 5.86     Percent free PSA  7/20/2023 6.14%     Free PSA  7/20/2023 0.36       Objective     Past Medical History:   Diagnosis Date    Estrada esophagus 2021    GERD (gastroesophageal reflux disease)     Hyperlipidemia 08/17/23    Low back pain     Peripheral neuropathy        Past Surgical History:   Procedure Laterality Date    COLONOSCOPY N/A 10/2/2023    Procedure: COLONOSCOPY WITH BIOPSY/COLD/HOT SNARE POLYPECTOMIES;  Surgeon: Frances Hallman MD;  Location: [facility] ENDOSCOPY;  Service: Gastroenterology;  Laterality: N/A;  COLON POLYPS, DIVERTICULOSIS    ENDOSCOPY N/A 10/2/2023    Procedure: ESOPHAGOGASTRODUODENOSCOPY with biopsies;  Surgeon: Frances Hallman MD;  Location: [facility] ENDOSCOPY;  Service: Gastroenterology;  Laterality: N/A;  ESOPHAGITIS, HIATAL HERNIA    EPIDURAL BLOCK      KNEE SURGERY Left     Menicus    UPPER GASTROINTESTINAL ENDOSCOPY  2021         Current Outpatient Medications:     atorvastatin (LIPITOR) 20 MG tablet, , Disp: , Rfl:     gabapentin (NEURONTIN) 600 MG tablet, Take 1 tablet by mouth 3 (Three) Times a Day., Disp: , Rfl:     leflunomide (ARAVA) 20 MG tablet, , Disp: , Rfl:     omeprazole (priLOSEC) 40 MG capsule, TAKE ONE CAPSULE via nasogastric tube EVERY DAY 30 MINUTES BEFORE morning meal, Disp: , Rfl:     tamsulosin (FLOMAX) 0.4 MG capsule 24 hr capsule, Take 2 capsules by mouth Daily for 360 days., Disp: 180 capsule, Rfl: 3    finasteride (PROSCAR) 5 MG tablet, Take 1 tablet by mouth Daily for 360 days., Disp: 90 tablet, Rfl: 3    No Known Allergies     Family History   Problem Relation Age of Onset    Esophageal cancer Father        Social History     Socioeconomic History    Marital status:    Tobacco Use    Smoking status:  "Former     Packs/day: 1.00     Years: 25.00     Additional pack years: 0.00     Total pack years: 25.00     Types: Cigarettes     Start date: 1984     Quit date: 2008     Years since quittin.9     Passive exposure: Past    Smokeless tobacco: Never   Vaping Use    Vaping Use: Never used   Substance and Sexual Activity    Alcohol use: Yes     Alcohol/week: 21.0 standard drinks of alcohol     Types: 21 Cans of beer per week     Comment: 3 drinks per day    Drug use: Not Currently     Types: Amphetamines, Marijuana    Sexual activity: Yes     Partners: Female     Birth control/protection: Same-sex partner       Vital Signs:   /93 (BP Location: Right arm, Patient Position: Sitting, Cuff Size: Adult)   Pulse 84   Ht 188 cm (74\")   Wt 97.8 kg (215 lb 9.8 oz)   BMI 27.68 kg/m²      Physical Exam  Vitals reviewed.   Constitutional:       Appearance: Normal appearance.   Neurological:      General: No focal deficit present.      Mental Status: He is alert and oriented to person, place, and time.   Psychiatric:         Mood and Affect: Mood normal.         Behavior: Behavior normal.          Result Review :   The following data was reviewed by: JUDITH Rosa on 10/25/2023:      Bladder Scan interpretation 10/25/2023    Estimation of residual urine via [employer] 3000 [employer] Bladder Scan  MA/nurse performing: Amy BOWLES MA  Residual Urine: 0 ml  Indication: Elevated prostate specific antigen (PSA)    Nocturia    Benign prostatic hyperplasia with lower urinary tract symptoms, symptom details unspecified   Position: Supine  Examination: Incremental scanning of the suprapubic area using 2.0 MHz transducer using copious amounts of acoustic gel.   Findings: An anechoic area was demonstrated which represented the bladder, with measurement of residual urine as noted. I inspected this myself. In that the residual urine was  insignificant, refer to plan for treatment and plan necessary at this time. "                Procedures        Assessment and Plan    Diagnoses and all orders for this visit:    1. Elevated prostate specific antigen (PSA) (Primary)  -     Bladder Scan  -     PSA DIAGNOSTIC; Future    2. Nocturia  -     finasteride (PROSCAR) 5 MG tablet; Take 1 tablet by mouth Daily for 360 days.  Dispense: 90 tablet; Refill: 3    3. Benign prostatic hyperplasia with lower urinary tract symptoms, symptom details unspecified  -     tamsulosin (FLOMAX) 0.4 MG capsule 24 hr capsule; Take 2 capsules by mouth Daily for 360 days.  Dispense: 180 capsule; Refill: 3  -     finasteride (PROSCAR) 5 MG tablet; Take 1 tablet by mouth Daily for 360 days.  Dispense: 90 tablet; Refill: 3    MRI results reviewed with patient.    Given that he is still having BPH symptoms and nocturia, we had further discussion about lifestyle changes including stopping caffeine and changing over from tea to water in the evening so that he is avoiding the caffeine.  We also discussed medication management for this we did increase his tamsulosin to 2 capsules daily.  We discussed the possibility of dizziness with this.  I am also adding finasteride.  He does understand that this can take 3 to 6 months to be effective.  We also discussed that when he gets his PSA results after having been on the finasteride for at least 3 months we would be doubling the PSA to get a more accurate number.  I will see him back in 6 months to see if this medication has been effective he will also get a PSA prior to this appointment so that we can evaluate where we stand with his elevated PSA.  If he is having worsening symptoms or not seeing any improvement after 3 months he may come in sooner for evaluation.      Follow Up   Return in about 6 months (around 4/25/2024) for with PSA prior, with PVR.  Patient was given instructions and counseling regarding his condition or for health maintenance advice. Please see specific information pulled into the AVS if  appropriate.         This document has been electronically signed by JUDITH Rosa  October 25, 2023 15:31 EDT

## 2023-10-25 ENCOUNTER — OFFICE VISIT (OUTPATIENT)
Dept: UROLOGY | Facility: CLINIC | Age: 58
End: 2023-10-25
Payer: COMMERCIAL

## 2023-10-25 VITALS
DIASTOLIC BLOOD PRESSURE: 93 MMHG | SYSTOLIC BLOOD PRESSURE: 154 MMHG | HEIGHT: 74 IN | BODY MASS INDEX: 27.67 KG/M2 | WEIGHT: 215.61 LBS | HEART RATE: 84 BPM

## 2023-10-25 DIAGNOSIS — N40.1 BENIGN PROSTATIC HYPERPLASIA WITH LOWER URINARY TRACT SYMPTOMS, SYMPTOM DETAILS UNSPECIFIED: ICD-10-CM

## 2023-10-25 DIAGNOSIS — R97.20 ELEVATED PROSTATE SPECIFIC ANTIGEN (PSA): Primary | ICD-10-CM

## 2023-10-25 DIAGNOSIS — R35.1 NOCTURIA: ICD-10-CM

## 2023-10-25 LAB — URINE VOLUME: 0

## 2023-10-25 RX ORDER — FINASTERIDE 5 MG/1
5 TABLET, FILM COATED ORAL DAILY
Qty: 90 TABLET | Refills: 3 | Status: SHIPPED | OUTPATIENT
Start: 2023-10-25 | End: 2024-10-19

## 2023-10-25 RX ORDER — TAMSULOSIN HYDROCHLORIDE 0.4 MG/1
2 CAPSULE ORAL DAILY
Qty: 180 CAPSULE | Refills: 3 | Status: SHIPPED | OUTPATIENT
Start: 2023-10-25 | End: 2024-10-19

## 2023-10-26 ENCOUNTER — CLINICAL SUPPORT (OUTPATIENT)
Dept: ORTHOPEDIC SURGERY | Facility: CLINIC | Age: 58
End: 2023-10-26
Payer: COMMERCIAL

## 2023-10-26 VITALS — HEIGHT: 74 IN | BODY MASS INDEX: 27.59 KG/M2 | WEIGHT: 215 LBS | TEMPERATURE: 98.6 F

## 2023-10-26 DIAGNOSIS — M17.12 PRIMARY OSTEOARTHRITIS OF LEFT KNEE: ICD-10-CM

## 2023-10-26 DIAGNOSIS — M17.11 PRIMARY OSTEOARTHRITIS OF RIGHT KNEE: ICD-10-CM

## 2023-10-26 RX ADMIN — LIDOCAINE HYDROCHLORIDE 2 ML: 10 INJECTION, SOLUTION EPIDURAL; INFILTRATION; INTRACAUDAL; PERINEURAL at 14:23

## 2023-10-26 NOTE — PROGRESS NOTES
"Chief Complaint  Follow-up and Pain of the Left Knee and Follow-up and Pain of the Right Knee    Subjective    History of Present Illness      Júnior Carbajal is a 58 y.o. male who presents to [facility] for Patient returns today for bilateral knee pain.  His pain is located over the medial aspect of the joint.  The pain has been progressive in nature and remains intermittent .  His pain is worsened by kneeling, squatting. There has been improvement in the past with injections.       Objective   Vital Signs:   Temp 98.6 °F (37 °C)   Ht 188 cm (74\")   Wt 97.5 kg (215 lb)   BMI 27.60 kg/m²     Physical Exam  58 y.o. male is awake, alert, oriented, in no acute distress and well developed, well nourished.  Ortho Exam   BILATERAL knee  Bilateral knee (varus). Patient has crepitus throughout range of motion. Positive patellar grind test. Mild effusion. Lachman is negative. Pivot shift is negative. Anterior and posterior drawer signs are negative. Significant joint line tenderness is noted on the medial aspect of the knee. Patient has a varus orientation of the knee. There is fullness and tenderness in the Popliteal fossa. Mild distention of a Popliteal cyst is noted in this location. Range of motion in flexion is from 0-110 degrees. Neurovascular status is intact.  Dorsalis pedis and posterior tibial artery pulses are palpable. Common peroneal nerve function is well preserved. Patient's gait is cautious and antalgic. Skin and soft tissues are mildly swollen, consistent with synovitis and effusion. The patient has a significant limp with the first few steps after starting the gait cycle. Getting out of a chair takes a lot of effort due to pain on knee flexion.       Result Review :                 - Large Joint Arthrocentesis: bilateral knee on 10/26/2023 2:23 PM  Indications: pain  Details: 25 G needle, anteromedial approach  Medications (Right): 2 mL lidocaine PF 1% 1 %; 88 mg Hyaluronan 88 " MG/4ML  Medications (Left): 2 mL lidocaine PF 1% 1 %; 88 mg Hyaluronan 88 MG/4ML  Outcome: tolerated well, no immediate complications  Procedure, treatment alternatives, risks and benefits explained, specific risks discussed. Consent was given by the patient. Patient was prepped and draped in the usual sterile fashion.                Assessment   Assessment and Plan    Problem List Items Addressed This Visit          Musculoskeletal and Injuries    Primary osteoarthritis of right knee    Relevant Orders    - Large Joint Arthrocentesis: bilateral knee    Primary osteoarthritis of left knee    Relevant Orders    - Large Joint Arthrocentesis: bilateral knee       Follow Up   Injected patient's bilateral knee joint(s)with Monovisc from an anteromedial approach   Compression/brace   Rest, ice, compression, and elevation (RICE) therapy  OTC Alternate Ibuprofen and Tylenol as needed  Follow up in 6 month(s)  Patient was given instructions and counseling regarding his condition or for health maintenance advice. Please see specific information pulled into the AVS if appropriate.     Elaido Montemayor MD   Date of Encounter: 10/26/2023   Electronically signed by Eladio Montemayor MD, 10/26/23, 2:23 PM EDT.     EMR Dragon/Transcription disclaimer:  Much of this encounter note is an electronic transcription/translation of spoken language to printed text. The electronic translation of spoken language may permit erroneous, or at times, nonsensical words or phrases to be inadvertently transcribed; Although I have reviewed the note for such errors, some may still exist.

## 2023-11-08 RX ORDER — LIDOCAINE HYDROCHLORIDE 10 MG/ML
2 INJECTION, SOLUTION EPIDURAL; INFILTRATION; INTRACAUDAL; PERINEURAL
Status: COMPLETED | OUTPATIENT
Start: 2023-10-26 | End: 2023-10-26

## 2023-12-14 ENCOUNTER — TELEPHONE (OUTPATIENT)
Dept: ORTHOPEDIC SURGERY | Facility: CLINIC | Age: 58
End: 2023-12-14
Payer: COMMERCIAL

## 2023-12-14 NOTE — TELEPHONE ENCOUNTER
Caller: Júnior Carbajal    Relationship to patient: Self    Best call back number: [phone]    Chief complaint: AUGUSTIN KNEE PAIN    Type of visit: GEL INJECTION    Requested date: ASAP

## 2023-12-14 NOTE — TELEPHONE ENCOUNTER
PATIENT NOT DUE FOR GEL INJECTION UNTIL  JANUARY 26TH, 2024    I TOLD PATIENT HE WOULD BE CONTACTED REGARDING THE APPROVAL BUT IT MAY NOT BE UNTIL WE ARE AT THE NEW OFFICE.

## 2024-02-08 ENCOUNTER — TELEPHONE (OUTPATIENT)
Dept: UROLOGY | Facility: CLINIC | Age: 59
End: 2024-02-08
Payer: COMMERCIAL

## 2024-02-08 NOTE — TELEPHONE ENCOUNTER
PT WAS SEEN IN OCTOBER AND HIS FLOMAX WAS INCREASED TO 2 PILLS DAILY. HE IS ALSO TAKING FINASTERIDE. HE CALLED TO REPORT THAT IT IS NOT HELPING AT ALL. HE IS STILL GETTING UP 4 TIMES A NIGHT TO URINATE. HE IS AWARE THAT BARBARA IS OUT THIS WEEK AND IT WILL BE NEXT WEEK BEFORE HE GETS A RESPONSE.

## 2024-02-13 ENCOUNTER — TELEPHONE (OUTPATIENT)
Dept: UROLOGY | Facility: CLINIC | Age: 59
End: 2024-02-13
Payer: COMMERCIAL

## 2024-03-05 NOTE — PROGRESS NOTES
Chief Complaint: Elevated PSA    Subjective         History of Present Illness  Júnior Carbajal is a 59 y.o. male presents to [facility] to be seen for follow-up.    Patient was previously seen by me with last visit on 10/25/2023 for follow-up of an MRI of the prostate due to elevated PSA.  At that visit we did start him on finasteride and increasing his tamsulosin to 2 capsules daily.  He was advised to follow-up with a repeat PSA in 6 months.  He is here for follow-up.    He reports he is not seeing any improvement in his urinary frequency despite taking tamusolin and finasteride.  He is still getting up 4-5 times per night.    He does admit that he has a history of sleep apnea but does not use his mask.    He does report that when he does urinate he urinates a large amount.    Previous 10/25/2023:  Patient was previously seen by me with last visit on 9/20/2023 for elevated PSA.  We did schedule him for an MRI of the prostate.  He is here for follow-up of those results.     He is still having nocturia 4-5 per night.      MRI OF THE PROSTATE WITH AND WITHOUT CONTRAST.     TECHNIQUE: Multiplanar, multi-sequential MRI of the pelvis/prostate  performed before and after the uneventful IV administration of 20 mL of  MultiHance as intravenous contrast.     CLINICAL HISTORY:  58-year-old male with elevated PSA.      Most recent PSA = 5.86 ng/ml on July 2023  Findings:     Prostate size and volume: 4.1 x 5.3 x 6.1 cm and 69.4 mL     PSA density: 0.09 ng/mL/cc     Length of membranous urethra: Approximately 1 cm     Post-biopsy hemorrhage: None.     Prostate:  There are no findings of suspicious prostatic lesion by PI-RADS  criteria. There is mild-to-moderate BPH.     Capsular margin and neurovascular bundle: Unremarkable.     Seminal vesicles: Unremarkable.     Lymph nodes: No pathologically enlarged pelvic or inguinal lymph nodes  by size criteria.     Bones: Unremarkable.     Other: The bladder is  trabeculated.     IMPRESSION:  1.   No findings of suspicious prostatic lesion by PI-RADS criteria.  2.  Mild-to-moderate BPH.  3.  Other findings as above.        This report was finalized on 10/23/2023 12:22 PM by Dr. Issa Moise M.D on Workstation: [id number]           Previous 9/20/2023:     Patient presents reporting he has been having difficulty with nocturia for several months. He reports that he doesn't go much during the day. He has been on tamsulosin for less than 1 month.      Frequency- denies during the day, only at night   Urgency- admits   Incontinence- denies   Nocturia- 4-5   Stream- weak   Perineal pain- denies   Dysuria- denies   GH- denies, but urine is dark   History of stones- 2, passed    surgeries- denies   Family history of  malignancy- denies   Cardiopulmonary- denies   Anticoagulants- denies   Smoker- denies     PSA  7/20/2023 5.86     Percent free PSA  7/20/2023 6.14%     Free PSA  7/20/2023 0.36             Objective     Past Medical History:   Diagnosis Date    Estrada esophagus 2021    GERD (gastroesophageal reflux disease)     Hyperlipidemia 08/17/23    Low back pain     Peripheral neuropathy        Past Surgical History:   Procedure Laterality Date    COLONOSCOPY N/A 10/2/2023    Procedure: COLONOSCOPY WITH BIOPSY/COLD/HOT SNARE POLYPECTOMIES;  Surgeon: Frances Hallman MD;  Location: [facility] ENDOSCOPY;  Service: Gastroenterology;  Laterality: N/A;  COLON POLYPS, DIVERTICULOSIS    ENDOSCOPY N/A 10/2/2023    Procedure: ESOPHAGOGASTRODUODENOSCOPY with biopsies;  Surgeon: Frances Hallman MD;  Location: [facility] ENDOSCOPY;  Service: Gastroenterology;  Laterality: N/A;  ESOPHAGITIS, HIATAL HERNIA    EPIDURAL BLOCK      KNEE SURGERY Left     Menicus    UPPER GASTROINTESTINAL ENDOSCOPY  2021         Current Outpatient Medications:     atorvastatin (LIPITOR) 20 MG tablet, , Disp: , Rfl:     finasteride (PROSCAR) 5 MG tablet, Take 1 tablet by mouth Daily for 360 days., Disp:  "90 tablet, Rfl: 3    fluticasone (FLONASE) 50 MCG/ACT nasal spray, 1 spray into the nostril(s) as directed by provider Daily., Disp: , Rfl:     gabapentin (NEURONTIN) 600 MG tablet, Take 1 tablet by mouth 3 (Three) Times a Day., Disp: , Rfl:     HYDROcodone-acetaminophen (NORCO) 7.5-325 MG per tablet, Take 1 tablet by mouth 3 times a day., Disp: , Rfl:     leflunomide (ARAVA) 20 MG tablet, , Disp: , Rfl:     omeprazole (priLOSEC) 40 MG capsule, TAKE ONE CAPSULE via nasogastric tube EVERY DAY 30 MINUTES BEFORE morning meal, Disp: , Rfl:     tamsulosin (FLOMAX) 0.4 MG capsule 24 hr capsule, Take 2 capsules by mouth Daily for 360 days., Disp: 180 capsule, Rfl: 3    zolpidem (AMBIEN) 10 MG tablet, Take 1 tablet by mouth At Night As Needed., Disp: , Rfl:     oxybutynin XL (DITROPAN-XL) 5 MG 24 hr tablet, Take 1 tablet by mouth Daily., Disp: 30 tablet, Rfl: 2    No Known Allergies     Family History   Problem Relation Age of Onset    Esophageal cancer Father        Social History     Socioeconomic History    Marital status:    Tobacco Use    Smoking status: Former     Current packs/day: 0.00     Average packs/day: 1 pack/day for 25.0 years (25.0 ttl pk-yrs)     Types: Cigarettes     Start date: 1/1/1984     Quit date: 11/17/2008     Years since quitting: 15.3     Passive exposure: Past    Smokeless tobacco: Never   Vaping Use    Vaping status: Never Used   Substance and Sexual Activity    Alcohol use: Yes     Alcohol/week: 21.0 standard drinks of alcohol     Types: 21 Cans of beer per week     Comment: 3 drinks per day    Drug use: Not Currently     Types: Amphetamines, Marijuana    Sexual activity: Yes     Partners: Female     Birth control/protection: Same-sex partner       Vital Signs:   /83 (BP Location: Right arm, Patient Position: Sitting, Cuff Size: Large Adult)   Pulse 83   Ht 188 cm (74\")   Wt 102 kg (224 lb)   BMI 28.76 kg/m²      Physical Exam  Vitals reviewed.   Constitutional:       " Appearance: Normal appearance.   Neurological:      General: No focal deficit present.      Mental Status: He is alert and oriented to person, place, and time.   Psychiatric:         Mood and Affect: Mood normal.         Behavior: Behavior normal.          Result Review :   The following data was reviewed by: JUDITH Rosa on 03/06/2024:  Results for orders placed or performed in visit on 03/06/24   Bladder Scan   Result Value Ref Range    Urine Volume 0         Bladder Scan interpretation 03/06/2024    Estimation of residual urine via [employer]VI 3000 [employer] Bladder Scan  MA/nurse performing: Amy BOWLES MA  Residual Urine: 0 ml  Indication: Elevated prostate specific antigen (PSA)    Urinary frequency    Nocturia    Benign prostatic hyperplasia with lower urinary tract symptoms, symptom details unspecified   Position: Supine  Examination: Incremental scanning of the suprapubic area using 2.0 MHz transducer using copious amounts of acoustic gel.   Findings: An anechoic area was demonstrated which represented the bladder, with measurement of residual urine as noted. I inspected this myself. In that the residual urine was  insignificant, refer to plan for treatment and plan necessary at this time.           Procedures        Assessment and Plan    Diagnoses and all orders for this visit:    1. Elevated prostate specific antigen (PSA) (Primary)  -     Bladder Scan    2. Urinary frequency  -     oxybutynin XL (DITROPAN-XL) 5 MG 24 hr tablet; Take 1 tablet by mouth Daily.  Dispense: 30 tablet; Refill: 2    3. Nocturia    4. Benign prostatic hyperplasia with lower urinary tract symptoms, symptom details unspecified    After extensive discussion about possible options for management of BPH with nocturia to include management of sleep apnea, possible procedures for the prostate, he is not willing to do any of the possible options available as far as procedures.  He will see his PCP to discuss getting tested again  for sleep apnea to see if there are any other options for treatment besides a mask since he was unable to tolerate that.    We did discuss that we could cautiously start him on something for overactive bladder to see if this helps his symptoms but there is risk of urinary retention.  He will continue on with his prostate medicines and started on oxybutynin.  We did discuss possible side effects of dry eyes dry mouth and constipation.  He will follow-up in 1 month for PVR and to see how the medication is working for him.    He will also go tomorrow to have his PSA drawn since he did not do this.  We did discuss that if he looks at his lab work and sees that his PSA is down he does need to double that number since he is on finasteride.      Follow Up   Return in about 4 weeks (around 4/3/2024).  Patient was given instructions and counseling regarding his condition or for health maintenance advice. Please see specific information pulled into the AVS if appropriate.         This document has been electronically signed by JUDITH Rosa  March 6, 2024 15:38 EST

## 2024-03-06 ENCOUNTER — OFFICE VISIT (OUTPATIENT)
Dept: UROLOGY | Facility: CLINIC | Age: 59
End: 2024-03-06
Payer: COMMERCIAL

## 2024-03-06 VITALS
HEART RATE: 83 BPM | WEIGHT: 224 LBS | BODY MASS INDEX: 28.75 KG/M2 | HEIGHT: 74 IN | DIASTOLIC BLOOD PRESSURE: 83 MMHG | SYSTOLIC BLOOD PRESSURE: 120 MMHG

## 2024-03-06 DIAGNOSIS — N40.1 BENIGN PROSTATIC HYPERPLASIA WITH LOWER URINARY TRACT SYMPTOMS, SYMPTOM DETAILS UNSPECIFIED: ICD-10-CM

## 2024-03-06 DIAGNOSIS — R35.1 NOCTURIA: ICD-10-CM

## 2024-03-06 DIAGNOSIS — R35.0 URINARY FREQUENCY: ICD-10-CM

## 2024-03-06 DIAGNOSIS — R97.20 ELEVATED PROSTATE SPECIFIC ANTIGEN (PSA): Primary | ICD-10-CM

## 2024-03-06 LAB — URINE VOLUME: 0

## 2024-03-06 RX ORDER — HYDROCODONE BITARTRATE AND ACETAMINOPHEN 7.5; 325 MG/1; MG/1
1 TABLET ORAL 3 TIMES DAILY
COMMUNITY
Start: 2024-03-04

## 2024-03-06 RX ORDER — ZOLPIDEM TARTRATE 10 MG/1
10 TABLET ORAL NIGHTLY PRN
COMMUNITY
Start: 2024-03-04

## 2024-03-06 RX ORDER — FLUTICASONE PROPIONATE 50 MCG
1 SPRAY, SUSPENSION (ML) NASAL DAILY
COMMUNITY
Start: 2023-12-12

## 2024-03-06 RX ORDER — OXYBUTYNIN CHLORIDE 5 MG/1
5 TABLET, EXTENDED RELEASE ORAL DAILY
Qty: 30 TABLET | Refills: 2 | Status: SHIPPED | OUTPATIENT
Start: 2024-03-06

## 2024-03-27 ENCOUNTER — TRANSCRIBE ORDERS (OUTPATIENT)
Dept: GENERAL RADIOLOGY | Facility: HOSPITAL | Age: 59
End: 2024-03-27
Payer: COMMERCIAL

## 2024-03-27 ENCOUNTER — LAB (OUTPATIENT)
Dept: LAB | Facility: HOSPITAL | Age: 59
End: 2024-03-27
Payer: COMMERCIAL

## 2024-03-27 ENCOUNTER — HOSPITAL ENCOUNTER (OUTPATIENT)
Dept: GENERAL RADIOLOGY | Facility: HOSPITAL | Age: 59
Discharge: HOME | End: 2024-03-27
Payer: COMMERCIAL

## 2024-03-27 DIAGNOSIS — M25.562 LEFT KNEE PAIN, UNSPECIFIED CHRONICITY: Primary | ICD-10-CM

## 2024-03-27 DIAGNOSIS — M25.462 PAIN AND SWELLING OF LEFT KNEE: ICD-10-CM

## 2024-03-27 DIAGNOSIS — R97.20 ELEVATED PROSTATE SPECIFIC ANTIGEN (PSA): ICD-10-CM

## 2024-03-27 DIAGNOSIS — M25.562 LEFT KNEE PAIN, UNSPECIFIED CHRONICITY: ICD-10-CM

## 2024-03-27 DIAGNOSIS — M25.562 PAIN AND SWELLING OF LEFT KNEE: ICD-10-CM

## 2024-03-27 LAB — PSA SERPL-MCNC: 3.59 NG/ML (ref 0–4)

## 2024-03-27 PROCEDURE — 36415 COLL VENOUS BLD VENIPUNCTURE: CPT

## 2024-03-27 PROCEDURE — 73560 X-RAY EXAM OF KNEE 1 OR 2: CPT

## 2024-03-27 PROCEDURE — 84153 ASSAY OF PSA TOTAL: CPT

## 2024-04-08 NOTE — PROGRESS NOTES
Chief Complaint: Elevated PSA    Subjective         History of Present Illness  Júnior Carbajal is a 59 y.o. male presents to [facility] to be seen for follow-up.    Patient was previously seen by me with last visit on 3/6/2024 for elevated PSA, urinary frequency, nocturia, BPH.  At that visit we did cautiously start him on oxybutynin.  He is here for follow-up.    Patient reports that his symptoms have improved significantly since starting on oxybutynin.  He does not feel he is having any trouble emptying his bladder.  He reports that some nights he does not get up at all of the nights he gets up 1 time.  He would definitely like to continue on this medication.    PSA  3/27/2024 3.59, on finasteride: 7.18 corrected, PSAd 0.10    Previous 3/6/2024:  Patient was previously seen by me with last visit on 10/25/2023 for follow-up of an MRI of the prostate due to elevated PSA.  At that visit we did start him on finasteride and increasing his tamsulosin to 2 capsules daily.  He was advised to follow-up with a repeat PSA in 6 months.  He is here for follow-up.     He reports he is not seeing any improvement in his urinary frequency despite taking tamusolin and finasteride.  He is still getting up 4-5 times per night.     He does admit that he has a history of sleep apnea but does not use his mask.     He does report that when he does urinate he urinates a large amount.     Previous 10/25/2023:  Patient was previously seen by me with last visit on 9/20/2023 for elevated PSA.  We did schedule him for an MRI of the prostate.  He is here for follow-up of those results.     He is still having nocturia 4-5 per night.      MRI OF THE PROSTATE WITH AND WITHOUT CONTRAST.     TECHNIQUE: Multiplanar, multi-sequential MRI of the pelvis/prostate  performed before and after the uneventful IV administration of 20 mL of  MultiHance as intravenous contrast.     CLINICAL HISTORY:  58-year-old male with elevated PSA.       Most recent PSA = 5.86 ng/ml on July 2023  Findings:     Prostate size and volume: 4.1 x 5.3 x 6.1 cm and 69.4 mL     PSA density: 0.09 ng/mL/cc     Length of membranous urethra: Approximately 1 cm     Post-biopsy hemorrhage: None.     Prostate:  There are no findings of suspicious prostatic lesion by PI-RADS  criteria. There is mild-to-moderate BPH.     Capsular margin and neurovascular bundle: Unremarkable.     Seminal vesicles: Unremarkable.     Lymph nodes: No pathologically enlarged pelvic or inguinal lymph nodes  by size criteria.     Bones: Unremarkable.     Other: The bladder is trabeculated.     IMPRESSION:  1.   No findings of suspicious prostatic lesion by PI-RADS criteria.  2.  Mild-to-moderate BPH.  3.  Other findings as above.        This report was finalized on 10/23/2023 12:22 PM by Dr. Issa Moise M.D on Workstation: [id number]           Previous 9/20/2023:     Patient presents reporting he has been having difficulty with nocturia for several months. He reports that he doesn't go much during the day. He has been on tamsulosin for less than 1 month.      Frequency- denies during the day, only at night   Urgency- admits   Incontinence- denies   Nocturia- 4-5   Stream- weak   Perineal pain- denies   Dysuria- denies   GH- denies, but urine is dark   History of stones- 2, passed    surgeries- denies   Family history of  malignancy- denies   Cardiopulmonary- denies   Anticoagulants- denies   Smoker- denies     PSA  7/20/2023 5.86     Percent free PSA  7/20/2023 6.14%     Free PSA  7/20/2023 0.36          Objective     Past Medical History:   Diagnosis Date    Estrada esophagus 2021    GERD (gastroesophageal reflux disease)     Hyperlipidemia 08/17/23    Low back pain     Peripheral neuropathy        Past Surgical History:   Procedure Laterality Date    COLONOSCOPY N/A 10/2/2023    Procedure: COLONOSCOPY WITH BIOPSY/COLD/HOT SNARE POLYPECTOMIES;  Surgeon: Frances Hallman MD;  Location:  [facility] ENDOSCOPY;  Service: Gastroenterology;  Laterality: N/A;  COLON POLYPS, DIVERTICULOSIS    ENDOSCOPY N/A 10/2/2023    Procedure: ESOPHAGOGASTRODUODENOSCOPY with biopsies;  Surgeon: Frances Hallman MD;  Location: [facility] ENDOSCOPY;  Service: Gastroenterology;  Laterality: N/A;  ESOPHAGITIS, HIATAL HERNIA    EPIDURAL BLOCK      KNEE SURGERY Left     Menicus    UPPER GASTROINTESTINAL ENDOSCOPY  2021         Current Outpatient Medications:     atorvastatin (LIPITOR) 20 MG tablet, , Disp: , Rfl:     finasteride (PROSCAR) 5 MG tablet, Take 1 tablet by mouth Daily for 360 days., Disp: 90 tablet, Rfl: 3    fluticasone (FLONASE) 50 MCG/ACT nasal spray, 1 spray into the nostril(s) as directed by provider Daily., Disp: , Rfl:     gabapentin (NEURONTIN) 600 MG tablet, Take 1 tablet by mouth 3 (Three) Times a Day., Disp: , Rfl:     HYDROcodone-acetaminophen (NORCO) 7.5-325 MG per tablet, Take 1 tablet by mouth 3 times a day., Disp: , Rfl:     leflunomide (ARAVA) 20 MG tablet, , Disp: , Rfl:     meloxicam (MOBIC) 7.5 MG tablet, Take 1 tablet by mouth Daily., Disp: , Rfl:     methylPREDNISolone (MEDROL) 4 MG dose pack, FOLLOW PACKAGE DIRECTIONS - TAKE WITH FOOD, Disp: , Rfl:     omeprazole (priLOSEC) 40 MG capsule, TAKE ONE CAPSULE via nasogastric tube EVERY DAY 30 MINUTES BEFORE morning meal, Disp: , Rfl:     oxybutynin XL (DITROPAN-XL) 5 MG 24 hr tablet, Take 1 tablet by mouth Daily for 360 days., Disp: 90 tablet, Rfl: 3    tamsulosin (FLOMAX) 0.4 MG capsule 24 hr capsule, Take 2 capsules by mouth Daily for 360 days., Disp: 180 capsule, Rfl: 3    zolpidem (AMBIEN) 10 MG tablet, Take 1 tablet by mouth At Night As Needed., Disp: , Rfl:     No Known Allergies     Family History   Problem Relation Age of Onset    Esophageal cancer Father        Social History     Socioeconomic History    Marital status:    Tobacco Use    Smoking status: Former     Current packs/day: 0.00     Average packs/day: 1 pack/day for 25.0  "years (25.0 ttl pk-yrs)     Types: Cigarettes     Start date: 1/1/1984     Quit date: 11/17/2008     Years since quitting: 15.4     Passive exposure: Past    Smokeless tobacco: Never   Vaping Use    Vaping status: Never Used   Substance and Sexual Activity    Alcohol use: Yes     Alcohol/week: 21.0 standard drinks of alcohol     Types: 21 Cans of beer per week     Comment: 3 drinks per day    Drug use: Not Currently     Types: Amphetamines, Marijuana    Sexual activity: Yes     Partners: Female     Birth control/protection: Same-sex partner       Vital Signs:   /74 (BP Location: Left arm, Patient Position: Sitting, Cuff Size: Large Adult)   Pulse 79   Ht 188 cm (74\")   Wt 102 kg (224 lb)   BMI 28.76 kg/m²      Physical Exam  Vitals reviewed.   Constitutional:       Appearance: Normal appearance.   Neurological:      General: No focal deficit present.      Mental Status: He is alert and oriented to person, place, and time.   Psychiatric:         Mood and Affect: Mood normal.         Behavior: Behavior normal.          Result Review :   The following data was reviewed by: JUDITH Rosa on 04/09/2024:  Results for orders placed or performed in visit on 04/09/24   Bladder Scan   Result Value Ref Range    Urine Volume 0       PSA          3/27/2024    15:07   PSA   PSA 3.590        Bladder Scan interpretation 04/09/2024    Estimation of residual urine via BVI 3000 [address]athon Bladder Scan  MA/nurse performing: Amy BOWLES MA  Residual Urine: 0 ml  Indication: Elevated prostate specific antigen (PSA)    Urinary frequency   Position: Supine  Examination: Incremental scanning of the suprapubic area using 2.0 MHz transducer using copious amounts of acoustic gel.   Findings: An anechoic area was demonstrated which represented the bladder, with measurement of residual urine as noted. I inspected this myself. In that the residual urine was stable or insignificant, refer to plan for treatment and plan " necessary at this time.         Procedures        Assessment and Plan    Diagnoses and all orders for this visit:    1. Elevated prostate specific antigen (PSA) (Primary)  -     Bladder Scan  -     Cancel: MRI Prostate With & Without Contrast; Future  -     MRI Prostate With & Without Contrast; Future    2. Urinary frequency  -     oxybutynin XL (DITROPAN-XL) 5 MG 24 hr tablet; Take 1 tablet by mouth Daily for 360 days.  Dispense: 90 tablet; Refill: 3    Given that his PSA has continued to climb, we discussed multiple options.  We did talk about possibly doing exosome urine test, but he is opted just to go ahead and have the MRI repeated.  We will go ahead and order an MRI of the prostate to delineate any underlying etiology of elevated PSA and potentially provide mapping for fusion biopsy.    Given that he is doing well with oxybutynin regarding continue this medication.    He will also continue his finasteride as well as tamsulosin.    I will see him back 1 week after MRI to go over results.      Follow Up   No follow-ups on file.  Patient was given instructions and counseling regarding his condition or for health maintenance advice. Please see specific information pulled into the AVS if appropriate.         This document has been electronically signed by JUDITH Rosa  April 9, 2024 15:26 EDT

## 2024-04-09 ENCOUNTER — OFFICE VISIT (OUTPATIENT)
Dept: UROLOGY | Facility: CLINIC | Age: 59
End: 2024-04-09
Payer: COMMERCIAL

## 2024-04-09 VITALS
HEART RATE: 79 BPM | SYSTOLIC BLOOD PRESSURE: 139 MMHG | BODY MASS INDEX: 28.75 KG/M2 | DIASTOLIC BLOOD PRESSURE: 74 MMHG | WEIGHT: 224 LBS | HEIGHT: 74 IN

## 2024-04-09 DIAGNOSIS — R35.0 URINARY FREQUENCY: ICD-10-CM

## 2024-04-09 DIAGNOSIS — R97.20 ELEVATED PROSTATE SPECIFIC ANTIGEN (PSA): Primary | ICD-10-CM

## 2024-04-09 LAB — URINE VOLUME: 0

## 2024-04-09 RX ORDER — MELOXICAM 7.5 MG/1
7.5 TABLET ORAL DAILY
COMMUNITY
Start: 2024-03-13 | End: 2025-03-13

## 2024-04-09 RX ORDER — METHYLPREDNISOLONE 4 MG/1
TABLET ORAL
COMMUNITY
Start: 2024-03-26

## 2024-04-09 RX ORDER — OXYBUTYNIN CHLORIDE 5 MG/1
5 TABLET, EXTENDED RELEASE ORAL DAILY
Qty: 90 TABLET | Refills: 3 | Status: SHIPPED | OUTPATIENT
Start: 2024-04-09 | End: 2025-04-04

## 2024-04-30 NOTE — PROGRESS NOTES
Chief Complaint  Follow-up and Pain of the Left Knee and Follow-up and Pain of the Right Knee    Subjective    History of Present Illness      Júnior Carbajal is a 57 y.o. male who presents to [facility] for Patient returns today for bilateral knee pain.  His pain is located over the medial aspect of the joint.  The pain has been progressive in nature and remains intermittent .  His pain is worsened by rising after sitting. There has been improvement in the past with injections.       Objective   Vital Signs:   Temp 97.9 °F (36.6 °C)   Ht 186.7 cm (73.5\")   Wt 99.8 kg (220 lb)   BMI 28.63 kg/m²     Physical Exam  57 y.o. male is awake, alert, oriented, in no acute distress and well developed, well nourished.  Ortho Exam   BILATERAL knee  Bilateral knee (varus). Patient has crepitus throughout range of motion. Positive patellar grind test. Mild effusion. Lachman is negative. Pivot shift is negative. Anterior and posterior drawer signs are negative. Significant joint line tenderness is noted on the medial aspect of the knee. Patient has a varus orientation of the knee. There is fullness and tenderness in the Popliteal fossa. Mild distention of a Popliteal cyst is noted in this location. Range of motion in flexion is from 0-120 degrees. Neurovascular status is intact.  Dorsalis pedis and posterior tibial artery pulses are palpable. Common peroneal nerve function is well preserved. Patient's gait is cautious and antalgic. Skin and soft tissues are mildly swollen, consistent with synovitis and effusion. The patient has a significant limp with the first few steps after starting the gait cycle. Getting out of a chair takes a lot of effort due to pain on knee flexion.       Result Review :                  Large Joint Arthrocentesis: R knee  Date/Time: 1/5/2023 3:25 PM  Consent given by: patient  Site marked: site marked  Timeout: Immediately prior to procedure a time out was called to verify  Department of Anesthesiology  Preprocedure Note       Name:  Chaz Newsome Jr.   Age:  71 y.o.  :  1952                                          MRN:  [id number]         Date:  2024      Surgeon: Surgeon(s):  Emperatriz Gomez MD    Procedure: Procedure(s):  COLONOSCOPY    Medications prior to admission:   Prior to Admission medications    Medication Sig Start Date End Date Taking? Authorizing Provider   amLODIPine-olmesartan (KRISTINE) 10-20 MG per tablet amlodipine 10 mg-olmesartan 20 mg tablet   TAKE 1 TABLET BY MOUTH EVERY DAY    Automatic Reconciliation, Ar   dutasteride (AVODART) 0.5 MG capsule Take 1 capsule by mouth daily    Automatic Reconciliation, Ar   folic acid (FOLVITE) 1 MG tablet Take 1 tablet by mouth daily 17   Automatic Reconciliation, Ar   methotrexate (RHEUMATREX) 2.5 MG chemo tablet TAKE 5 TABLETS BY MOUTH ONCE WEEKLY ON THE SAME DAY 21   Automatic Reconciliation, Ar   spironolactone (ALDACTONE) 100 MG tablet Take 1 tablet by mouth daily 10/30/18   Automatic Reconciliation, Ar       Current medications:    Current Facility-Administered Medications   Medication Dose Route Frequency Provider Last Rate Last Admin   • 0.9 % sodium chloride infusion   IntraVENous Continuous Emperatriz Gomez MD       • sodium chloride flush 0.9 % injection 5-40 mL  5-40 mL IntraVENous 2 times per day Emperatriz Gomez MD       • sodium chloride flush 0.9 % injection 5-40 mL  5-40 mL IntraVENous PRN Emperatriz Gomez MD       • 0.9 % sodium chloride infusion  25 mL IntraVENous PRN Emperatriz Gomez MD           Allergies:    Allergies   Allergen Reactions   • Sulfa Antibiotics      Other reaction(s): Unknown (comments)       Problem List:    Patient Active Problem List   Diagnosis Code   • Right knee pain M25.561   • Tendon rupture, traumatic. quadriceps, right, subsequent encounter S76.111D   • Effusion of right knee M25.461   • S/P right knee arthroscopy Z98.890       Past Medical  the correct patient, procedure, equipment, support staff and site/side marked as required   Supporting Documentation  Indications: pain   Procedure Details  Location: knee - R knee  Preparation: Patient was prepped and draped in the usual sterile fashion  Needle size: 25 G  Approach: anteromedial  Medications administered: 2 mL lidocaine PF 2% 2 %; 160 mg methylPREDNISolone acetate 80 MG/ML  Patient tolerance: patient tolerated the procedure well with no immediate complications    Large Joint Arthrocentesis: L knee  Date/Time: 1/5/2023 3:25 PM  Consent given by: patient  Site marked: site marked  Timeout: Immediately prior to procedure a time out was called to verify the correct patient, procedure, equipment, support staff and site/side marked as required   Supporting Documentation  Indications: pain   Procedure Details  Location: knee - L knee  Preparation: Patient was prepped and draped in the usual sterile fashion  Needle size: 25 G  Approach: anteromedial  Medications administered: 2 mL lidocaine PF 2% 2 %; 160 mg methylPREDNISolone acetate 80 MG/ML  Patient tolerance: patient tolerated the procedure well with no immediate complications               Assessment   Assessment and Plan    Problem List Items Addressed This Visit        Musculoskeletal and Injuries    Primary osteoarthritis of right knee    Relevant Orders    Large Joint Arthrocentesis: R knee    Primary osteoarthritis of left knee - Primary    Relevant Orders    Large Joint Arthrocentesis: L knee       Follow Up   · Injected patient's bilateral knee joint(s)with Depo-Medrol from an anteromedial approach   · Compression/brace   · Rest, ice, compression, and elevation (RICE) therapy  · OTC Alternate Ibuprofen and Tylenol as needed  · Follow up in 3 month(s)  • Patient was given instructions and counseling regarding his condition or for health maintenance advice. Please see specific information pulled into the AVS if appropriate.     Eladio Montemayor MD    Date of Encounter: 1/5/2023   Electronically signed by Eladio Montemayor MD, 01/05/23, 3:25 PM EST.     EMR Dragon/Transcription disclaimer:  Much of this encounter note is an electronic transcription/translation of spoken language to printed text. The electronic translation of spoken language may permit erroneous, or at times, nonsensical words or phrases to be inadvertently transcribed; Although I have reviewed the note for such errors, some may still exist.

## 2024-05-29 ENCOUNTER — HOSPITAL ENCOUNTER (OUTPATIENT)
Facility: HOSPITAL | Age: 59
Discharge: HOME | End: 2024-05-29
Admitting: NURSE PRACTITIONER
Payer: COMMERCIAL

## 2024-05-29 DIAGNOSIS — R97.20 ELEVATED PROSTATE SPECIFIC ANTIGEN (PSA): ICD-10-CM

## 2024-05-29 PROCEDURE — 72197 MRI PELVIS W/O & W/DYE: CPT

## 2024-05-29 PROCEDURE — 0 GADOBENATE DIMEGLUMINE 529 MG/ML SOLUTION: Performed by: NURSE PRACTITIONER

## 2024-05-29 PROCEDURE — A9577 INJ MULTIHANCE: HCPCS | Performed by: NURSE PRACTITIONER

## 2024-05-29 RX ADMIN — GADOBENATE DIMEGLUMINE 20 ML: 529 INJECTION, SOLUTION INTRAVENOUS at 15:04

## 2024-06-03 ENCOUNTER — TELEPHONE (OUTPATIENT)
Dept: UROLOGY | Facility: CLINIC | Age: 59
End: 2024-06-03
Payer: COMMERCIAL

## 2024-06-03 NOTE — PROGRESS NOTES
Chief Complaint: Elevated PSA    Subjective         History of Present Illness  Júnior Carbajal is a 59 y.o. male presents to [facility] to be seen for follow-up.    Patient was previously seen by me with last visit on 4/9/2024 for elevated PSA/urinary frequency/nocturia/BPH.  At that visit because of his continued rise in PSA we did order an MRI of the prostate he is here to go over those results.    He reports he is only getting up 0-2 times per night instead of 4-5 times.     PSA 7.18, PSAd 0.14    EXAM: MRI OF THE PROSTATE WITH AND WITHOUT CONTRAST.     TECHNIQUE: Multiplanar, multi-sequential MRI of the prostate performed  before and after the administration of IV MultiHance.     CLINICAL HISTORY: Elevated PSA, most recently 3.59 ng/mL March 2024.     Findings:     Prostate size and volume: 3.6 x 4.4 x 5 cm and 52 cc.     PSA density: 0.07 ng/mL/cc        Prostate: Mild BPH without focal suspicious lesion.     Capsular margin and neurovascular bundle: Unremarkable.     Seminal vesicles: Unremarkable.     Lymph nodes: No pathologically enlarged pelvic or inguinal lymph nodes  by size criteria.     Bones: Unremarkable.     Other: Underdistention of the bladder limits evaluation.              IMPRESSION:  PI-RADS 2 = low-clinically significant cancer unlikely.           This report was finalized on 6/3/2024 10:23 AM by Dr. Varun Mederos M.D on Workstation: [id number]    Previous 4/9/2024:  Patient was previously seen by me with last visit on 3/6/2024 for elevated PSA, urinary frequency, nocturia, BPH.  At that visit we did cautiously start him on oxybutynin.  He is here for follow-up.     Patient reports that his symptoms have improved significantly since starting on oxybutynin.  He does not feel he is having any trouble emptying his bladder.  He reports that some nights he does not get up at all of the nights he gets up 1 time.  He would definitely like to continue on this  medication.     PSA  3/27/2024 3.59, on finasteride: 7.18 corrected, PSAd 0.10     Previous 3/6/2024:  Patient was previously seen by me with last visit on 10/25/2023 for follow-up of an MRI of the prostate due to elevated PSA.  At that visit we did start him on finasteride and increasing his tamsulosin to 2 capsules daily.  He was advised to follow-up with a repeat PSA in 6 months.  He is here for follow-up.     He reports he is not seeing any improvement in his urinary frequency despite taking tamusolin and finasteride.  He is still getting up 4-5 times per night.     He does admit that he has a history of sleep apnea but does not use his mask.     He does report that when he does urinate he urinates a large amount.     Previous 10/25/2023:  Patient was previously seen by me with last visit on 9/20/2023 for elevated PSA.  We did schedule him for an MRI of the prostate.  He is here for follow-up of those results.     He is still having nocturia 4-5 per night.      MRI OF THE PROSTATE WITH AND WITHOUT CONTRAST.     TECHNIQUE: Multiplanar, multi-sequential MRI of the pelvis/prostate  performed before and after the uneventful IV administration of 20 mL of  MultiHance as intravenous contrast.     CLINICAL HISTORY:  58-year-old male with elevated PSA.      Most recent PSA = 5.86 ng/ml on July 2023  Findings:     Prostate size and volume: 4.1 x 5.3 x 6.1 cm and 69.4 mL     PSA density: 0.09 ng/mL/cc     Length of membranous urethra: Approximately 1 cm     Post-biopsy hemorrhage: None.     Prostate:  There are no findings of suspicious prostatic lesion by PI-RADS  criteria. There is mild-to-moderate BPH.     Capsular margin and neurovascular bundle: Unremarkable.     Seminal vesicles: Unremarkable.     Lymph nodes: No pathologically enlarged pelvic or inguinal lymph nodes  by size criteria.     Bones: Unremarkable.     Other: The bladder is trabeculated.     IMPRESSION:  1.   No findings of suspicious prostatic lesion by  PI-RADS criteria.  2.  Mild-to-moderate BPH.  3.  Other findings as above.        This report was finalized on 10/23/2023 12:22 PM by Dr. Issa Moise M.D on Workstation: [id number]           Previous 9/20/2023:     Patient presents reporting he has been having difficulty with nocturia for several months. He reports that he doesn't go much during the day. He has been on tamsulosin for less than 1 month.      Frequency- denies during the day, only at night   Urgency- admits   Incontinence- denies   Nocturia- 4-5   Stream- weak   Perineal pain- denies   Dysuria- denies   GH- denies, but urine is dark   History of stones- 2, passed    surgeries- denies   Family history of  malignancy- denies   Cardiopulmonary- denies   Anticoagulants- denies   Smoker- denies     PSA  7/20/2023 5.86     Percent free PSA  7/20/2023 6.14%     Free PSA  7/20/2023 0.36    Objective     Past Medical History:   Diagnosis Date    Estrada esophagus 2021    GERD (gastroesophageal reflux disease)     Hyperlipidemia 08/17/23    Low back pain     Peripheral neuropathy        Past Surgical History:   Procedure Laterality Date    COLONOSCOPY N/A 10/2/2023    Procedure: COLONOSCOPY WITH BIOPSY/COLD/HOT SNARE POLYPECTOMIES;  Surgeon: Frances Hallman MD;  Location: [facility];  Service: Gastroenterology;  Laterality: N/A;  COLON POLYPS, DIVERTICULOSIS    ENDOSCOPY N/A 10/2/2023    Procedure: ESOPHAGOGASTRODUODENOSCOPY with biopsies;  Surgeon: Frances Hallman MD;  Location: [facility] END[facility];  Service: Gastroenterology;  Laterality: N/A;  ESOPHAGITIS, HIATAL HERNIA    EPIDURAL BLOCK      KNEE SURGERY Left     Menicus    UPPER GASTROINTESTINAL ENDOSCOPY  2021         Current Outpatient Medications:     atorvastatin (LIPITOR) 20 MG tablet, , Disp: , Rfl:     cephalexin (KEFLEX) 500 MG capsule, Take 1 capsule by mouth 4 (Four) Times a Day., Disp: , Rfl:     finasteride (PROSCAR) 5 MG tablet, Take 1 tablet by mouth Daily for 360  days., Disp: 90 tablet, Rfl: 3    fluticasone (FLONASE) 50 MCG/ACT nasal spray, 1 spray into the nostril(s) as directed by provider Daily., Disp: , Rfl:     gabapentin (NEURONTIN) 600 MG tablet, Take 1 tablet by mouth 3 (Three) Times a Day., Disp: , Rfl:     HYDROcodone-acetaminophen (NORCO) 7.5-325 MG per tablet, Take 1 tablet by mouth 3 times a day., Disp: , Rfl:     leflunomide (ARAVA) 20 MG tablet, , Disp: , Rfl:     meloxicam (MOBIC) 7.5 MG tablet, Take 1 tablet by mouth Daily., Disp: , Rfl:     methylPREDNISolone (MEDROL) 4 MG dose pack, FOLLOW PACKAGE DIRECTIONS - TAKE WITH FOOD, Disp: , Rfl:     omeprazole (priLOSEC) 40 MG capsule, TAKE ONE CAPSULE via nasogastric tube EVERY DAY 30 MINUTES BEFORE morning meal, Disp: , Rfl:     oxybutynin XL (DITROPAN-XL) 5 MG 24 hr tablet, Take 1 tablet by mouth Daily for 360 days., Disp: 90 tablet, Rfl: 3    tamsulosin (FLOMAX) 0.4 MG capsule 24 hr capsule, Take 2 capsules by mouth Daily for 360 days., Disp: 180 capsule, Rfl: 3    zolpidem (AMBIEN) 10 MG tablet, Take 1 tablet by mouth At Night As Needed., Disp: , Rfl:     No Known Allergies     Family History   Problem Relation Age of Onset    Esophageal cancer Father        Social History     Socioeconomic History    Marital status:    Tobacco Use    Smoking status: Former     Current packs/day: 0.00     Average packs/day: 1 pack/day for 25.0 years (25.0 ttl pk-yrs)     Types: Cigarettes     Start date: 1/1/1984     Quit date: 11/17/2008     Years since quitting: 15.5     Passive exposure: Past    Smokeless tobacco: Never   Vaping Use    Vaping status: Never Used   Substance and Sexual Activity    Alcohol use: Yes     Alcohol/week: 21.0 standard drinks of alcohol     Types: 21 Cans of beer per week     Comment: 3 drinks per day    Drug use: Not Currently     Types: Amphetamines, Marijuana    Sexual activity: Yes     Partners: Female     Birth control/protection: Same-sex partner       Vital Signs:   /80 (BP  "Location: Right arm, Patient Position: Sitting, Cuff Size: Large Adult)   Pulse 83   Ht 188 cm (74\")   Wt 102 kg (224 lb 13.9 oz)   BMI 28.87 kg/m²      Physical Exam  Vitals reviewed.   Constitutional:       Appearance: Normal appearance.   Neurological:      General: No focal deficit present.      Mental Status: He is alert and oriented to person, place, and time.   Psychiatric:         Mood and Affect: Mood normal.         Behavior: Behavior normal.          Result Review :   The following data was reviewed by: JUDITH Rosa on 06/05/2024:  Results for orders placed or performed in visit on 04/09/24   Bladder Scan   Result Value Ref Range    Urine Volume 0       PSA          3/27/2024    15:07   PSA   PSA 3.590          Procedures        Assessment and Plan    Diagnoses and all orders for this visit:    1. Elevated prostate specific antigen (PSA) (Primary)  -     PSA DIAGNOSTIC; Future    Reviewed MRI results with patient.  Given that he has no suspicious lesions and his PSA density is less than 0.15, I will plan to repeat his PSA in 6 months.  He will continue on his finasteride and his tamsulosin.    Will see him back in 6 months with PSA prior    Follow Up   Return in about 6 months (around 12/5/2024) for with PSA prior.  Patient was given instructions and counseling regarding his condition or for health maintenance advice. Please see specific information pulled into the AVS if appropriate.         This document has been electronically signed by JUDITH Rosa  June 5, 2024 14:10 EDT       "

## 2024-06-03 NOTE — TELEPHONE ENCOUNTER
Called Radiology about his MRI results and when we would have them because he has a F/U coming up and Kiki said she would send the radiologist a message to expedite the results.

## 2024-06-05 ENCOUNTER — OFFICE VISIT (OUTPATIENT)
Dept: UROLOGY | Facility: CLINIC | Age: 59
End: 2024-06-05
Payer: COMMERCIAL

## 2024-06-05 VITALS
SYSTOLIC BLOOD PRESSURE: 117 MMHG | HEIGHT: 74 IN | BODY MASS INDEX: 28.86 KG/M2 | DIASTOLIC BLOOD PRESSURE: 80 MMHG | WEIGHT: 224.87 LBS | HEART RATE: 83 BPM

## 2024-06-05 DIAGNOSIS — R97.20 ELEVATED PROSTATE SPECIFIC ANTIGEN (PSA): Primary | ICD-10-CM

## 2024-06-05 RX ORDER — CEPHALEXIN 500 MG/1
500 CAPSULE ORAL 4 TIMES DAILY
COMMUNITY
Start: 2024-06-01 | End: 2024-06-08

## 2025-02-03 ENCOUNTER — TRANSCRIBE ORDERS (OUTPATIENT)
Dept: ADMINISTRATIVE | Facility: HOSPITAL | Age: 60
End: 2025-02-03
Payer: COMMERCIAL

## 2025-02-03 ENCOUNTER — HOSPITAL ENCOUNTER (OUTPATIENT)
Dept: CARDIOLOGY | Facility: HOSPITAL | Age: 60
Discharge: HOME | End: 2025-02-03
Admitting: STUDENT IN AN ORGANIZED HEALTH CARE EDUCATION/TRAINING PROGRAM
Payer: COMMERCIAL

## 2025-02-03 DIAGNOSIS — R60.0 BILATERAL LEG EDEMA: ICD-10-CM

## 2025-02-03 DIAGNOSIS — R60.0 BILATERAL LEG EDEMA: Primary | ICD-10-CM

## 2025-02-03 DIAGNOSIS — M79.605 PAIN IN BOTH LOWER EXTREMITIES: ICD-10-CM

## 2025-02-03 DIAGNOSIS — M79.604 PAIN IN BOTH LOWER EXTREMITIES: ICD-10-CM

## 2025-02-03 LAB

## 2025-02-03 PROCEDURE — 93970 EXTREMITY STUDY: CPT

## 2025-02-03 PROCEDURE — 93970 EXTREMITY STUDY: CPT | Performed by: SURGERY

## 2025-02-04 NOTE — PROGRESS NOTES
Chief Complaint: Elevated PSA    Subjective         History of Present Illness  Júnior Carbajal is a 59 y.o. male presents to [facility] to be seen for follow-up.    Patient was previously seen by me with last visit on 6/5/2024 for elevated PSA/urinary frequency/nocturia/BPH.  At that visit we reviewed his MRI which did not show any suspicious lesions and his PSA density was less than 0.15 we opted to repeat PSA in 6 months.  He also plan to continue on his finasteride and tamsulosin.  He is here for follow-up.    He reports he is only taking tamsulosin and oxybutynin and doing very well with this. He only gets up once per night.     He has not had his PSA completed.    Previous 6/5/2024:  Patient was previously seen by me with last visit on 4/9/2024 for elevated PSA/urinary frequency/nocturia/BPH.  At that visit because of his continued rise in PSA we did order an MRI of the prostate he is here to go over those results.     He reports he is only getting up 0-2 times per night instead of 4-5 times.      PSA 7.18, PSAd 0.14     EXAM: MRI OF THE PROSTATE WITH AND WITHOUT CONTRAST.     TECHNIQUE: Multiplanar, multi-sequential MRI of the prostate performed  before and after the administration of IV MultiHance.     CLINICAL HISTORY: Elevated PSA, most recently 3.59 ng/mL March 2024.     Findings:     Prostate size and volume: 3.6 x 4.4 x 5 cm and 52 cc.     PSA density: 0.07 ng/mL/cc        Prostate: Mild BPH without focal suspicious lesion.     Capsular margin and neurovascular bundle: Unremarkable.     Seminal vesicles: Unremarkable.     Lymph nodes: No pathologically enlarged pelvic or inguinal lymph nodes  by size criteria.     Bones: Unremarkable.     Other: Underdistention of the bladder limits evaluation.              IMPRESSION:  PI-RADS 2 = low-clinically significant cancer unlikely.           This report was finalized on 6/3/2024 10:23 AM by Dr. Varun Mederos M.D on Workstation:  [id number]     Previous 4/9/2024:  Patient was previously seen by me with last visit on 3/6/2024 for elevated PSA, urinary frequency, nocturia, BPH.  At that visit we did cautiously start him on oxybutynin.  He is here for follow-up.     Patient reports that his symptoms have improved significantly since starting on oxybutynin.  He does not feel he is having any trouble emptying his bladder.  He reports that some nights he does not get up at all of the nights he gets up 1 time.  He would definitely like to continue on this medication.     PSA  3/27/2024 3.59, on finasteride: 7.18 corrected, PSAd 0.10     Previous 3/6/2024:  Patient was previously seen by me with last visit on 10/25/2023 for follow-up of an MRI of the prostate due to elevated PSA.  At that visit we did start him on finasteride and increasing his tamsulosin to 2 capsules daily.  He was advised to follow-up with a repeat PSA in 6 months.  He is here for follow-up.     He reports he is not seeing any improvement in his urinary frequency despite taking tamusolin and finasteride.  He is still getting up 4-5 times per night.     He does admit that he has a history of sleep apnea but does not use his mask.     He does report that when he does urinate he urinates a large amount.     Previous 10/25/2023:  Patient was previously seen by me with last visit on 9/20/2023 for elevated PSA.  We did schedule him for an MRI of the prostate.  He is here for follow-up of those results.     He is still having nocturia 4-5 per night.      MRI OF THE PROSTATE WITH AND WITHOUT CONTRAST.     TECHNIQUE: Multiplanar, multi-sequential MRI of the pelvis/prostate  performed before and after the uneventful IV administration of 20 mL of  MultiHance as intravenous contrast.     CLINICAL HISTORY:  58-year-old male with elevated PSA.      Most recent PSA = 5.86 ng/ml on July 2023  Findings:     Prostate size and volume: 4.1 x 5.3 x 6.1 cm and 69.4 mL     PSA density: 0.09  ng/mL/cc     Length of membranous urethra: Approximately 1 cm     Post-biopsy hemorrhage: None.     Prostate:  There are no findings of suspicious prostatic lesion by PI-RADS  criteria. There is mild-to-moderate BPH.     Capsular margin and neurovascular bundle: Unremarkable.     Seminal vesicles: Unremarkable.     Lymph nodes: No pathologically enlarged pelvic or inguinal lymph nodes  by size criteria.     Bones: Unremarkable.     Other: The bladder is trabeculated.     IMPRESSION:  1.   No findings of suspicious prostatic lesion by PI-RADS criteria.  2.  Mild-to-moderate BPH.  3.  Other findings as above.        This report was finalized on 10/23/2023 12:22 PM by Dr. Issa Moise M.D on Workstation: [id number]           Previous 9/20/2023:     Patient presents reporting he has been having difficulty with nocturia for several months. He reports that he doesn't go much during the day. He has been on tamsulosin for less than 1 month.      Frequency- denies during the day, only at night   Urgency- admits   Incontinence- denies   Nocturia- 4-5   Stream- weak   Perineal pain- denies   Dysuria- denies   GH- denies, but urine is dark   History of stones- 2, passed    surgeries- denies   Family history of  malignancy- denies   Cardiopulmonary- denies   Anticoagulants- denies   Smoker- denies     PSA  7/20/2023 5.86     Percent free PSA  7/20/2023 6.14%     Free PSA  7/20/2023 0.36       Objective     Past Medical History:   Diagnosis Date    Estrada esophagus 2021    GERD (gastroesophageal reflux disease)     Hyperlipidemia 08/17/23    Low back pain     Peripheral neuropathy        Past Surgical History:   Procedure Laterality Date    COLONOSCOPY N/A 10/2/2023    Procedure: COLONOSCOPY WITH BIOPSY/COLD/HOT SNARE POLYPECTOMIES;  Surgeon: Frances Hallman MD;  Location: [facility] ENDOSCOPY;  Service: Gastroenterology;  Laterality: N/A;  COLON POLYPS, DIVERTICULOSIS    ENDOSCOPY N/A 10/2/2023    Procedure:  ESOPHAGOGASTRODUODENOSCOPY with biopsies;  Surgeon: Frances Hallman MD;  Location: [facility] ENDOSCOPY;  Service: Gastroenterology;  Laterality: N/A;  ESOPHAGITIS, HIATAL HERNIA    EPIDURAL BLOCK      KNEE SURGERY Left     Menicus    UPPER GASTROINTESTINAL ENDOSCOPY  2021         Current Outpatient Medications:     atorvastatin (LIPITOR) 20 MG tablet, , Disp: , Rfl:     citalopram (CeleXA) 20 MG tablet, Take 1 tablet by mouth Daily., Disp: , Rfl:     Enbrel SureClick 50 MG/ML solution auto-injector, Inject 50 mg under the skin into the appropriate area as directed., Disp: , Rfl:     fluticasone (FLONASE) 50 MCG/ACT nasal spray, Administer 1 spray into the nostril(s) as directed by provider Daily., Disp: , Rfl:     furosemide (LASIX) 20 MG tablet, Take 1 tablet by mouth Daily., Disp: , Rfl:     gabapentin (NEURONTIN) 600 MG tablet, Take 1 tablet by mouth 3 (Three) Times a Day., Disp: , Rfl:     HYDROcodone-acetaminophen (NORCO) 7.5-325 MG per tablet, Take 1 tablet by mouth 3 times a day., Disp: , Rfl:     Multi-Vitamin tablet tablet, Take 1 tablet by mouth Daily., Disp: , Rfl:     omeprazole (priLOSEC) 40 MG capsule, TAKE ONE CAPSULE via nasogastric tube EVERY DAY 30 MINUTES BEFORE morning meal, Disp: , Rfl:     oxybutynin XL (DITROPAN-XL) 5 MG 24 hr tablet, Take 1 tablet by mouth Daily., Disp: 90 tablet, Rfl: 4    oxyCODONE-acetaminophen (PERCOCET) 7.5-325 MG per tablet, Take 1 tablet by mouth Every 6 (Six) Hours As Needed. for pain, Disp: , Rfl:     tamsulosin (FLOMAX) 0.4 MG capsule 24 hr capsule, Take 1 capsule by mouth Daily., Disp: 90 capsule, Rfl: 4    zolpidem (AMBIEN) 10 MG tablet, Take 1 tablet by mouth At Night As Needed., Disp: , Rfl:     methylPREDNISolone (MEDROL) 4 MG dose pack, FOLLOW PACKAGE DIRECTIONS - TAKE WITH FOOD, Disp: , Rfl:     No Known Allergies     Family History   Problem Relation Age of Onset    Esophageal cancer Father        Social History     Socioeconomic History    Marital  "status:    Tobacco Use    Smoking status: Former     Current packs/day: 0.00     Average packs/day: 1 pack/day for 25.0 years (25.0 ttl pk-yrs)     Types: Cigarettes     Start date: 1984     Quit date: 2008     Years since quittin.2     Passive exposure: Past    Smokeless tobacco: Never   Vaping Use    Vaping status: Never Used   Substance and Sexual Activity    Alcohol use: Yes     Alcohol/week: 21.0 standard drinks of alcohol     Types: 21 Cans of beer per week     Comment: 3 drinks per day    Drug use: Not Currently     Types: Amphetamines, Marijuana    Sexual activity: Yes     Partners: Female     Birth control/protection: Partner of same sex       Vital Signs:   Resp 14   Ht 188 cm (74\")   Wt 102 kg (224 lb 13.9 oz)   BMI 28.87 kg/m²      Physical Exam  Vitals reviewed.   Constitutional:       Appearance: Normal appearance.   Neurological:      General: No focal deficit present.      Mental Status: He is alert and oriented to person, place, and time.   Psychiatric:         Mood and Affect: Mood normal.         Behavior: Behavior normal.          Result Review :   The following data was reviewed by: JUDITH Rosa on 2025:     PSA          3/27/2024    15:07   PSA   PSA 3.590      Bladder Scan interpretation 2025    Estimation of residual urine via BVI 3000 Verathon Bladder Scan  MA/nurse performing: Amy BOWLES MA  Residual Urine: 0 ml  Indication: Elevated prostate specific antigen (PSA)    Urinary frequency    Benign prostatic hyperplasia with lower urinary tract symptoms, symptom details unspecified   Position: Supine  Examination: Incremental scanning of the suprapubic area using 2.0 MHz transducer using copious amounts of acoustic gel.   Findings: An anechoic area was demonstrated which represented the bladder, with measurement of residual urine as noted. I inspected this myself. In that the residual urine was stable or insignificant, refer to plan for " treatment and plan necessary at this time.         Procedures        Assessment and Plan    Diagnoses and all orders for this visit:    1. Elevated prostate specific antigen (PSA) (Primary)  -     Bladder Scan    2. Urinary frequency  -     tamsulosin (FLOMAX) 0.4 MG capsule 24 hr capsule; Take 1 capsule by mouth Daily.  Dispense: 90 capsule; Refill: 4  -     oxybutynin XL (DITROPAN-XL) 5 MG 24 hr tablet; Take 1 tablet by mouth Daily.  Dispense: 90 tablet; Refill: 4    3. Benign prostatic hyperplasia with lower urinary tract symptoms, symptom details unspecified  -     tamsulosin (FLOMAX) 0.4 MG capsule 24 hr capsule; Take 1 capsule by mouth Daily.  Dispense: 90 capsule; Refill: 4    He will go today and have his PSA completed, I will let him know when I have those results available.    He will continue on tamsulosin as well as oxybutynin.    As long as his PSA is within normal limits, we will plan to see him back in 1 year.    Follow Up   No follow-ups on file.  Patient was given instructions and counseling regarding his condition or for health maintenance advice. Please see specific information pulled into the AVS if appropriate.         This document has been electronically signed by JUDITH Rosa  February 13, 2025 15:50 EST

## 2025-02-13 ENCOUNTER — LAB (OUTPATIENT)
Facility: HOSPITAL | Age: 60
End: 2025-02-13
Payer: COMMERCIAL

## 2025-02-13 ENCOUNTER — OFFICE VISIT (OUTPATIENT)
Dept: UROLOGY | Age: 60
End: 2025-02-13
Payer: COMMERCIAL

## 2025-02-13 VITALS — RESPIRATION RATE: 14 BRPM | BODY MASS INDEX: 28.86 KG/M2 | WEIGHT: 224.87 LBS | HEIGHT: 74 IN

## 2025-02-13 DIAGNOSIS — N40.1 BENIGN PROSTATIC HYPERPLASIA WITH LOWER URINARY TRACT SYMPTOMS, SYMPTOM DETAILS UNSPECIFIED: ICD-10-CM

## 2025-02-13 DIAGNOSIS — R35.0 URINARY FREQUENCY: ICD-10-CM

## 2025-02-13 DIAGNOSIS — R97.20 ELEVATED PROSTATE SPECIFIC ANTIGEN (PSA): ICD-10-CM

## 2025-02-13 DIAGNOSIS — R97.20 ELEVATED PROSTATE SPECIFIC ANTIGEN (PSA): Primary | ICD-10-CM

## 2025-02-13 LAB
PSA SERPL-MCNC: 2.04 NG/ML (ref 0–4)
URINE VOLUME: 0

## 2025-02-13 PROCEDURE — 84153 ASSAY OF PSA TOTAL: CPT

## 2025-02-13 PROCEDURE — 36415 COLL VENOUS BLD VENIPUNCTURE: CPT

## 2025-02-13 RX ORDER — OXYBUTYNIN CHLORIDE 5 MG/1
5 TABLET, EXTENDED RELEASE ORAL DAILY
Qty: 90 TABLET | Refills: 4 | Status: SHIPPED | OUTPATIENT
Start: 2025-02-13

## 2025-02-13 RX ORDER — CITALOPRAM HYDROBROMIDE 20 MG/1
1 TABLET ORAL DAILY
COMMUNITY
Start: 2025-02-04

## 2025-02-13 RX ORDER — TAMSULOSIN HYDROCHLORIDE 0.4 MG/1
0.4 CAPSULE ORAL DAILY
Qty: 90 CAPSULE | Refills: 4 | Status: SHIPPED | OUTPATIENT
Start: 2025-02-13

## 2025-02-13 RX ORDER — OXYCODONE AND ACETAMINOPHEN 7.5; 325 MG/1; MG/1
1 TABLET ORAL EVERY 6 HOURS PRN
COMMUNITY

## 2025-02-13 RX ORDER — FUROSEMIDE 20 MG/1
1 TABLET ORAL DAILY
COMMUNITY
Start: 2025-02-04

## 2025-02-13 RX ORDER — DIPHENOXYLATE HYDROCHLORIDE AND ATROPINE SULFATE 2.5; .025 MG/1; MG/1
1 TABLET ORAL DAILY
COMMUNITY

## 2025-02-13 RX ORDER — TAMSULOSIN HYDROCHLORIDE 0.4 MG/1
0.4 CAPSULE ORAL DAILY
COMMUNITY
Start: 2024-10-24 | End: 2025-02-13 | Stop reason: SDUPTHER

## 2025-07-15 ENCOUNTER — TELEPHONE (OUTPATIENT)
Dept: UROLOGY | Age: 60
End: 2025-07-15
Payer: COMMERCIAL

## 2025-07-15 DIAGNOSIS — N40.1 BENIGN PROSTATIC HYPERPLASIA WITH LOWER URINARY TRACT SYMPTOMS, SYMPTOM DETAILS UNSPECIFIED: ICD-10-CM

## 2025-07-15 DIAGNOSIS — R35.0 URINARY FREQUENCY: ICD-10-CM

## 2025-07-15 RX ORDER — TAMSULOSIN HYDROCHLORIDE 0.4 MG/1
2 CAPSULE ORAL DAILY
Qty: 180 CAPSULE | Refills: 4 | Status: SHIPPED | OUTPATIENT
Start: 2025-07-15

## 2025-07-15 NOTE — TELEPHONE ENCOUNTER
Per the wife he has been taking 2 a day for quite some time. The pharmacy does need an updated script for that please.

## 2025-07-15 NOTE — TELEPHONE ENCOUNTER
PHARMACY CALLED TO SEE IF THE PATIENT IS SUPPOSED TO BE ON TAMSULOSIN 2 CAPSULES DAILY INSTEAD OF ONE.  THE WIFE CALLED FOR REFILL AND THEY DO NOT HAVE THAT SCRIPT.

## 2025-07-29 NOTE — TELEPHONE ENCOUNTER
Patient is agreeable to egd. I have placed an order for second sign.   Patient refuses colon cancer screening colonoscopy.    Improved

## 2025-08-13 DIAGNOSIS — R35.0 URINARY FREQUENCY: ICD-10-CM

## 2025-08-13 DIAGNOSIS — N40.1 BENIGN PROSTATIC HYPERPLASIA WITH LOWER URINARY TRACT SYMPTOMS, SYMPTOM DETAILS UNSPECIFIED: ICD-10-CM

## 2025-08-13 RX ORDER — TAMSULOSIN HYDROCHLORIDE 0.4 MG/1
1 CAPSULE ORAL DAILY
Qty: 90 CAPSULE | Refills: 4 | Status: SHIPPED | OUTPATIENT
Start: 2025-08-13 | End: 2025-08-13

## 2025-08-13 RX ORDER — TAMSULOSIN HYDROCHLORIDE 0.4 MG/1
2 CAPSULE ORAL DAILY
Qty: 180 CAPSULE | Refills: 4 | Status: SHIPPED | OUTPATIENT
Start: 2025-08-13

## (undated) DEVICE — SINGLE-USE BIOPSY FORCEPS: Brand: RADIAL JAW 4

## (undated) DEVICE — LINER SURG CANSTR SXN S/RIGD 1500CC

## (undated) DEVICE — BLCK/BITE BLOX WO/DENTL/RIM W/STRAP 54F

## (undated) DEVICE — SNAR POLYP CAPTIFLEX XS/OVL 11X2.4MM 240CM 1P/U

## (undated) DEVICE — Device

## (undated) DEVICE — CONN JET HYDRA H20 AUXILIARY DISP

## (undated) DEVICE — PAD GRND REM POLYHESIVE A/ DISP

## (undated) DEVICE — Device: Brand: DEFENDO AIR/WATER/SUCTION AND BIOPSY VALVE

## (undated) DEVICE — THE SINGLE USE ETRAP – POLYP TRAP IS USED FOR SUCTION RETRIEVAL OF ENDOSCOPICALLY REMOVED POLYPS.: Brand: ETRAP

## (undated) DEVICE — SOL IRRG H2O PL/BG 1000ML STRL

## (undated) DEVICE — SOLIDIFIER LIQLOC PLS 1500CC BT